# Patient Record
Sex: MALE | Race: WHITE | Employment: OTHER | URBAN - METROPOLITAN AREA
[De-identification: names, ages, dates, MRNs, and addresses within clinical notes are randomized per-mention and may not be internally consistent; named-entity substitution may affect disease eponyms.]

---

## 2017-01-05 ENCOUNTER — APPOINTMENT (OUTPATIENT)
Dept: ULTRASOUND IMAGING | Facility: HOSPITAL | Age: 82
End: 2017-01-05
Attending: EMERGENCY MEDICINE
Payer: MEDICARE

## 2017-01-05 ENCOUNTER — HOSPITAL ENCOUNTER (EMERGENCY)
Facility: HOSPITAL | Age: 82
Discharge: HOME | End: 2017-01-05
Attending: EMERGENCY MEDICINE
Payer: MEDICARE

## 2017-01-05 VITALS
RESPIRATION RATE: 18 BRPM | HEIGHT: 70 IN | DIASTOLIC BLOOD PRESSURE: 86 MMHG | WEIGHT: 250 LBS | BODY MASS INDEX: 35.79 KG/M2 | TEMPERATURE: 98 F | HEART RATE: 98 BPM | OXYGEN SATURATION: 98 % | SYSTOLIC BLOOD PRESSURE: 118 MMHG

## 2017-01-05 DIAGNOSIS — R60.0 EXTREMITY EDEMA: Primary | ICD-10-CM

## 2017-01-05 DIAGNOSIS — L03.119 CELLULITIS OF LOWER EXTREMITY, UNSPECIFIED LATERALITY: ICD-10-CM

## 2017-01-05 LAB
ALBUMIN SERPL-MCNC: 3.4 G/DL (ref 3.5–4.8)
ALP LIVER SERPL-CCNC: 96 U/L (ref 45–117)
ALT SERPL-CCNC: 25 U/L (ref 17–63)
APTT PPP: 29.2 SECONDS (ref 25–34)
AST SERPL-CCNC: 19 U/L (ref 15–41)
BASOPHILS # BLD AUTO: 0.06 X10(3) UL (ref 0–0.1)
BASOPHILS NFR BLD AUTO: 0.8 %
BILIRUB SERPL-MCNC: 0.3 MG/DL (ref 0.1–2)
BUN BLD-MCNC: 14 MG/DL (ref 8–20)
CALCIUM BLD-MCNC: 8.6 MG/DL (ref 8.3–10.3)
CHLORIDE: 102 MMOL/L (ref 101–111)
CO2: 27 MMOL/L (ref 22–32)
CREAT BLD-MCNC: 1.56 MG/DL (ref 0.7–1.3)
EOSINOPHIL # BLD AUTO: 0.34 X10(3) UL (ref 0–0.3)
EOSINOPHIL NFR BLD AUTO: 4.6 %
ERYTHROCYTE [DISTWIDTH] IN BLOOD BY AUTOMATED COUNT: 13.2 % (ref 11.5–16)
GLUCOSE BLD-MCNC: 256 MG/DL (ref 70–99)
HCT VFR BLD AUTO: 47.6 % (ref 37–53)
HGB BLD-MCNC: 15.3 G/DL (ref 13–17)
IMMATURE GRANULOCYTE COUNT: 0.03 X10(3) UL (ref 0–1)
IMMATURE GRANULOCYTE RATIO %: 0.4 %
INR BLD: 1 (ref 0.89–1.12)
LYMPHOCYTES # BLD AUTO: 1.07 X10(3) UL (ref 0.9–4)
LYMPHOCYTES NFR BLD AUTO: 14.4 %
M PROTEIN MFR SERPL ELPH: 7.9 G/DL (ref 6.1–8.3)
MCH RBC QN AUTO: 30.6 PG (ref 27–33.2)
MCHC RBC AUTO-ENTMCNC: 32.1 G/DL (ref 31–37)
MCV RBC AUTO: 95.2 FL (ref 80–99)
MONOCYTES # BLD AUTO: 0.63 X10(3) UL (ref 0.1–0.6)
MONOCYTES NFR BLD AUTO: 8.5 %
NEUTROPHIL ABS PRELIM: 5.31 X10 (3) UL (ref 1.3–6.7)
NEUTROPHILS # BLD AUTO: 5.31 X10(3) UL (ref 1.3–6.7)
NEUTROPHILS NFR BLD AUTO: 71.3 %
PLATELET # BLD AUTO: 230 10(3)UL (ref 150–450)
POTASSIUM SERPL-SCNC: 4.1 MMOL/L (ref 3.6–5.1)
PSA SERPL DL<=0.01 NG/ML-MCNC: 13.5 SECONDS (ref 12.3–14.8)
RBC # BLD AUTO: 5 X10(6)UL (ref 3.8–5.8)
RED CELL DISTRIBUTION WIDTH-SD: 46.5 FL (ref 35.1–46.3)
SODIUM SERPL-SCNC: 136 MMOL/L (ref 136–144)
WBC # BLD AUTO: 7.4 X10(3) UL (ref 4–13)

## 2017-01-05 PROCEDURE — 99285 EMERGENCY DEPT VISIT HI MDM: CPT

## 2017-01-05 PROCEDURE — 85610 PROTHROMBIN TIME: CPT | Performed by: EMERGENCY MEDICINE

## 2017-01-05 PROCEDURE — 85730 THROMBOPLASTIN TIME PARTIAL: CPT | Performed by: EMERGENCY MEDICINE

## 2017-01-05 PROCEDURE — 99284 EMERGENCY DEPT VISIT MOD MDM: CPT

## 2017-01-05 PROCEDURE — 93970 EXTREMITY STUDY: CPT

## 2017-01-05 PROCEDURE — 96375 TX/PRO/DX INJ NEW DRUG ADDON: CPT

## 2017-01-05 PROCEDURE — 80053 COMPREHEN METABOLIC PANEL: CPT | Performed by: EMERGENCY MEDICINE

## 2017-01-05 PROCEDURE — 96365 THER/PROPH/DIAG IV INF INIT: CPT

## 2017-01-05 PROCEDURE — 85025 COMPLETE CBC W/AUTO DIFF WBC: CPT | Performed by: EMERGENCY MEDICINE

## 2017-01-05 RX ORDER — FUROSEMIDE 10 MG/ML
40 INJECTION INTRAMUSCULAR; INTRAVENOUS ONCE
Status: COMPLETED | OUTPATIENT
Start: 2017-01-05 | End: 2017-01-05

## 2017-01-05 RX ORDER — FUROSEMIDE 40 MG/1
40 TABLET ORAL 2 TIMES DAILY
Qty: 20 TABLET | Refills: 0 | Status: SHIPPED | OUTPATIENT
Start: 2017-01-05 | End: 2017-06-28

## 2017-01-05 RX ORDER — CEPHALEXIN 500 MG/1
500 CAPSULE ORAL 3 TIMES DAILY
Qty: 30 CAPSULE | Refills: 0 | Status: SHIPPED | OUTPATIENT
Start: 2017-01-05 | End: 2017-01-15

## 2017-01-05 NOTE — ED PROVIDER NOTES
Patient Seen in: [facility] Emergency Department    History   Patient presents with:  Swelling Edema (cardiovascular, metabolic)    Stated Complaint: leg swelling    HPI    25-year-old male presents for evaluation of swollen legs.   Patient has chron MG Oral Tab,  Take 1 tablet (10 mg total) by mouth nightly. Multiple Vitamin Oral Tab,  Take 1 tablet by mouth daily. Ranitidine HCl (ZANTAC) 150 MG Oral Tab,  Take 1 tablet by mouth 2 (two) times daily.    Diapers & Supplies ([facility] DIAPERS JUMBO S5) Does speech pattern  Musculoskeletal: No tenderness or deformity noted.           ED Course     Labs Reviewed   COMP METABOLIC PANEL (14) - Abnormal; Notable for the following:     Glucose 256 (*)     Creatinine 1.56 (*)     GFR 41 (*)     Albumin 3.4 (*)     Al visit.     Follow-up:  Juwan Rojas MD  [phone]    In 2 days  For wound re-check      Medications Prescribed:  Current Discharge Medication List    START taking these medications    furosemide (LASIX

## 2017-01-05 NOTE — ED INITIAL ASSESSMENT (HPI)
Pt instructed from PCP to come and be evaluated for leg swelling, pt family states legs are usually swollen but not this large and pt has hard time walking

## 2017-05-19 ENCOUNTER — HOSPITAL ENCOUNTER (INPATIENT)
Facility: HOSPITAL | Age: 82
LOS: 1 days | Discharge: HOME | DRG: 603 | End: 2017-05-20
Attending: EMERGENCY MEDICINE | Admitting: HOSPITALIST
Payer: MEDICARE

## 2017-05-19 ENCOUNTER — APPOINTMENT (OUTPATIENT)
Dept: GENERAL RADIOLOGY | Facility: HOSPITAL | Age: 82
DRG: 603 | End: 2017-05-19
Attending: EMERGENCY MEDICINE
Payer: MEDICARE

## 2017-05-19 ENCOUNTER — APPOINTMENT (OUTPATIENT)
Dept: ULTRASOUND IMAGING | Facility: HOSPITAL | Age: 82
DRG: 603 | End: 2017-05-19
Attending: EMERGENCY MEDICINE
Payer: MEDICARE

## 2017-05-19 DIAGNOSIS — I87.8 VENOUS STASIS: ICD-10-CM

## 2017-05-19 DIAGNOSIS — L03.116 CELLULITIS OF LEFT LOWER EXTREMITY: Primary | ICD-10-CM

## 2017-05-19 DIAGNOSIS — E11.40 TYPE 2 DIABETES MELLITUS WITH DIABETIC NEUROPATHY, WITHOUT LONG-TERM CURRENT USE OF INSULIN (HCC): ICD-10-CM

## 2017-05-19 DIAGNOSIS — L03.115 CELLULITIS OF RIGHT LOWER EXTREMITY: ICD-10-CM

## 2017-05-19 DIAGNOSIS — R60.0 BILATERAL LEG EDEMA: ICD-10-CM

## 2017-05-19 PROCEDURE — 96365 THER/PROPH/DIAG IV INF INIT: CPT

## 2017-05-19 PROCEDURE — 71010 XR CHEST AP PORTABLE  (CPT=71010): CPT | Performed by: EMERGENCY MEDICINE

## 2017-05-19 PROCEDURE — 80053 COMPREHEN METABOLIC PANEL: CPT | Performed by: EMERGENCY MEDICINE

## 2017-05-19 PROCEDURE — 85025 COMPLETE CBC W/AUTO DIFF WBC: CPT | Performed by: EMERGENCY MEDICINE

## 2017-05-19 PROCEDURE — 99285 EMERGENCY DEPT VISIT HI MDM: CPT

## 2017-05-19 PROCEDURE — 87040 BLOOD CULTURE FOR BACTERIA: CPT | Performed by: EMERGENCY MEDICINE

## 2017-05-19 PROCEDURE — 83880 ASSAY OF NATRIURETIC PEPTIDE: CPT | Performed by: EMERGENCY MEDICINE

## 2017-05-19 PROCEDURE — 93970 EXTREMITY STUDY: CPT | Performed by: EMERGENCY MEDICINE

## 2017-05-19 RX ORDER — POTASSIUM CHLORIDE 20 MEQ/1
20 TABLET, EXTENDED RELEASE ORAL 2 TIMES DAILY
Status: DISCONTINUED | OUTPATIENT
Start: 2017-05-19 | End: 2017-05-20

## 2017-05-19 RX ORDER — MEMANTINE HYDROCHLORIDE 10 MG/1
10 TABLET ORAL 2 TIMES DAILY
Status: DISCONTINUED | OUTPATIENT
Start: 2017-05-19 | End: 2017-05-20

## 2017-05-19 RX ORDER — FAMOTIDINE 20 MG/1
20 TABLET ORAL NIGHTLY
Status: DISCONTINUED | OUTPATIENT
Start: 2017-05-19 | End: 2017-05-20

## 2017-05-19 RX ORDER — DEXTROSE MONOHYDRATE 25 G/50ML
50 INJECTION, SOLUTION INTRAVENOUS
Status: DISCONTINUED | OUTPATIENT
Start: 2017-05-19 | End: 2017-05-20

## 2017-05-19 RX ORDER — HEPARIN SODIUM 5000 [USP'U]/ML
5000 INJECTION, SOLUTION INTRAVENOUS; SUBCUTANEOUS EVERY 8 HOURS SCHEDULED
Status: DISCONTINUED | OUTPATIENT
Start: 2017-05-19 | End: 2017-05-20

## 2017-05-19 RX ORDER — OXYBUTYNIN CHLORIDE 5 MG/1
5 TABLET ORAL 3 TIMES DAILY
Status: DISCONTINUED | OUTPATIENT
Start: 2017-05-19 | End: 2017-05-20

## 2017-05-19 RX ORDER — BUMETANIDE 2 MG/1
2 TABLET ORAL DAILY
Status: DISCONTINUED | OUTPATIENT
Start: 2017-05-20 | End: 2017-05-20

## 2017-05-19 RX ORDER — LISINOPRIL 40 MG/1
40 TABLET ORAL
Status: DISCONTINUED | OUTPATIENT
Start: 2017-05-20 | End: 2017-05-20

## 2017-05-19 RX ORDER — ONDANSETRON 2 MG/ML
4 INJECTION INTRAMUSCULAR; INTRAVENOUS EVERY 6 HOURS PRN
Status: DISCONTINUED | OUTPATIENT
Start: 2017-05-19 | End: 2017-05-20

## 2017-05-19 RX ORDER — POLYETHYLENE GLYCOL 3350 17 G/17G
17 POWDER, FOR SOLUTION ORAL DAILY
Status: DISCONTINUED | OUTPATIENT
Start: 2017-05-19 | End: 2017-05-20

## 2017-05-19 RX ORDER — ACETAMINOPHEN 325 MG/1
650 TABLET ORAL EVERY 6 HOURS PRN
Status: DISCONTINUED | OUTPATIENT
Start: 2017-05-19 | End: 2017-05-20

## 2017-05-19 RX ORDER — DOXEPIN HYDROCHLORIDE 50 MG/1
1 CAPSULE ORAL DAILY
Status: DISCONTINUED | OUTPATIENT
Start: 2017-05-20 | End: 2017-05-20

## 2017-05-20 VITALS
HEIGHT: 72 IN | RESPIRATION RATE: 18 BRPM | HEART RATE: 80 BPM | BODY MASS INDEX: 34.56 KG/M2 | OXYGEN SATURATION: 94 % | DIASTOLIC BLOOD PRESSURE: 49 MMHG | SYSTOLIC BLOOD PRESSURE: 112 MMHG | WEIGHT: 255.19 LBS | TEMPERATURE: 98 F

## 2017-05-20 PROBLEM — M79.89 LEG SWELLING: Status: ACTIVE | Noted: 2017-05-20

## 2017-05-20 PROBLEM — Z91.81 AT RISK FOR FALLING: Status: ACTIVE | Noted: 2017-05-20

## 2017-05-20 PROCEDURE — 83036 HEMOGLOBIN GLYCOSYLATED A1C: CPT | Performed by: HOSPITALIST

## 2017-05-20 PROCEDURE — 82962 GLUCOSE BLOOD TEST: CPT

## 2017-05-20 PROCEDURE — 85610 PROTHROMBIN TIME: CPT | Performed by: HOSPITALIST

## 2017-05-20 PROCEDURE — 80048 BASIC METABOLIC PNL TOTAL CA: CPT | Performed by: HOSPITALIST

## 2017-05-20 PROCEDURE — 85025 COMPLETE CBC W/AUTO DIFF WBC: CPT | Performed by: HOSPITALIST

## 2017-05-20 NOTE — ED PROVIDER NOTES
Patient Seen in: [facility] Emergency Department    History   Patient presents with:  Swelling Edema (cardiovascular, metabolic)    Stated Complaint: lower extremity edema.   needs placement    HPI    51-year-old male presents to the emergency depart Tab,  TK 1 T PO QHS PRN FOR SLEEP OR ANXIETY   glipiZIDE 5 MG Oral Tab,  Take 1 tablet (5 mg total) by mouth 2 (two) times daily before meals. Oxybutynin Chloride 5 MG Oral Tab,  Take 1 tablet (5 mg total) by mouth 3 (three) times daily.    Memantine HCl Neck: Normal range of motion. Neck supple. Cardiovascular: Normal rate, regular rhythm, normal heart sounds and intact distal pulses. Pulmonary/Chest: Effort normal and breath sounds normal. No stridor. He has no wheezes. He has no rales.    Abdomina DRAW LIGHT GREEN   BLOOD CULTURE   BLOOD CULTURE    chest x-ray without obvious infiltrate or significant fluid overload  Ultrasound of lower extremities no evidence of DVT marked soft tissue edema    MDM     Patient had an IV established and blood work ob

## 2017-05-20 NOTE — PROGRESS NOTES
[facility] Pharmacy Note: Renal dose adjustment for Famotidine (Pepcid)  Mei Villalobos has been prescribed Famotidine (Pepcid) 20 mg every 12 hours. Estimated Creatinine Clearance: 45.7 mL/min (based on Cr of 1.39).     His calculated creatinine clear

## 2017-05-20 NOTE — PLAN OF CARE
NURSING DISCHARGE NOTE    Discharged Home via Wheelchair. Accompanied by Spouse  Belongings Taken by patient/family. AVS and education provided to patient and wife at the bedside.  Discharge plan of care and follow up needs reviewed, patient/wife ex

## 2017-05-20 NOTE — PROGRESS NOTES
[facility] Dosing Service  Antibiotic Dosing    Carl Romo is a 80year old male for whom pharmacy is dosing Unasyn for treatment of  cellulitis. Allergies: has No Known Allergies.     Vitals: /55 mmHg  Pulse 101  Temp(Src) 98.5 °F (36.9 °C

## 2017-05-20 NOTE — H&P
[facility] Hospitalist History and Physical      Patient presents with:  Swelling Edema (cardiovascular, metabolic)       PCP: Nataliia Gilmore MD      History of Present Illness: Patient is a 80year old male with PMH sig for T2DM, HTN, chronic venous stasis, depre Disp: 60 tablet Rfl: 1   Oxybutynin Chloride 5 MG Oral Tab Take 1 tablet (5 mg total) by mouth 3 (three) times daily. Disp: 90 tablet Rfl: 3   Memantine HCl (NAMENDA) 10 MG Oral Tab Take 1 tablet (10 mg total) by mouth 2 (two) times daily.  Disp: 60 tablet normal. No masses,  No organomegaly. Non distended   Extremities: Chronic venous stasis changes / lymphedema bilaterally   Skin: Bilateral chronic appearing swelling stasis changes, skin thickening, slightly red.  No drainage   Neurologic: Normal strength, Maral Carranza MD on 5/19/2017 at 20:28     Approved by: Maral Carranza MD            Xr Chest Ap Portable  (cpt=71010)    5/19/2017  PROCEDURE:  XR CHEST AP PORTABLE (CPT=71010)  TECHNIQUE:  AP chest radiograph was obtained.   COMPARISON:  [facility]

## 2017-05-20 NOTE — PROGRESS NOTES
NURSING ADMISSION NOTE      Patient admitted via cart. Oriented to room. Safety precautions initiated. Bed in low position. Bed alarm on. Call light in reach. Primary language is [facility]. Patient is poor historian,family unavailable.

## 2017-05-20 NOTE — CONSULTS
INFECTIOUS DISEASE CONSULTATION    Germania Macdonald Patient Status:  Inpatient    1935 MRN [id number]   [facility] 4NW-A Attending Cale Michaud MD   [facility] Day # 1 PCP Jovi Jewell mg, Oral, Nightly  •  glucose (DEX4) oral liquid 15 g, 15 g, Oral, Q15 Min PRN **OR** Glucose-Vitamin C (DEX-4) 4-0.006 g chewable tab 4 tablet, 4 tablet, Oral, Q15 Min PRN **OR** dextrose injection 50 mL, 50 mL, Intravenous, Q15 Min PRN **OR** glucose (DE 110   --    AST  12*   --    ALT  20   --    BILT  0.4   --    TP  8.2   --            Lab Results  Component Value Date   INR 1.11 05/20/2017   PTP 14.4 05/20/2017   PGLU 178 05/20/2017       Problem list reviewed:  Patient Active Problem List:     James Schmidt

## 2017-05-20 NOTE — ED NOTES
PCT 4 M/O WITH REOPRT GIVEN TO AND ACCP[ETED BY MAY STUART.   PREPARD FOR TX  PER CART VIA TRANSOPRT

## 2017-05-20 NOTE — CM/SW NOTE
05/20/17 1100   CM/SW Referral Data   Referral Source Social Work (self-referral)   Reason for Referral Discharge planning   Informant Spouse   Patient Info   Patient's Mental Status Alert   Patient's [address]   Patient lives with Spouse

## 2017-05-20 NOTE — PLAN OF CARE
SKIN/TISSUE INTEGRITY - ADULT    • Skin integrity remains intact Not Progressing        Left feet skin tear cleaned new dressing applied. Wound care referral made.    CARDIOVASCULAR - ADULT    • Maintains optimal cardiac output and hemodynamic stability Pro

## 2017-05-24 NOTE — DISCHARGE SUMMARY
General Medicine Discharge Summary     Patient ID:  Minto Santiago  80year old  6/4/1935    Admit date: 5/19/2017    Discharge date and time: 5/20/2017  1:20 PM     Attending Physician: Shelley Khan admitted for worsening wounds of his lower extremities, possible cellulitis    Bilateral chronic lymphedema/stasis changes  -Started on IV unasyn for possible cellulitis  -Venous duplex negative for DVT  -ID consulted, agree with recommendations, changes a R-5    Multiple Vitamin Oral Tab  Take 1 tablet by mouth daily. , Historical    Ranitidine HCl (ZANTAC) 150 MG Oral Tab  Take 1 tablet by mouth 2 (two) times daily. , Normal, Disp-60 tablet, R-11    Diapers & Supplies ([facility] DIAPERS JUMBO S5) Does not apply Mi

## 2017-06-01 ENCOUNTER — OFFICE VISIT (OUTPATIENT)
Dept: WOUND CARE | Facility: HOSPITAL | Age: 82
End: 2017-06-01
Attending: NURSE PRACTITIONER
Payer: MEDICARE

## 2017-06-01 DIAGNOSIS — L97.511 NON-PRESSURE CHRONIC ULCER OF OTHER PART OF RIGHT FOOT LIMITED TO BREAKDOWN OF SKIN (HCC): Primary | ICD-10-CM

## 2017-06-01 DIAGNOSIS — Q82.0 HEREDITARY LYMPHEDEMA: ICD-10-CM

## 2017-06-01 DIAGNOSIS — B37.2 CANDIDIASIS OF SKIN: ICD-10-CM

## 2017-06-01 PROCEDURE — 29581 APPL MULTLAYER CMPRN SYS LEG: CPT

## 2017-06-01 PROCEDURE — 99214 OFFICE O/P EST MOD 30 MIN: CPT

## 2017-06-05 ENCOUNTER — OFFICE VISIT (OUTPATIENT)
Dept: WOUND CARE | Facility: HOSPITAL | Age: 82
End: 2017-06-05
Attending: NURSE PRACTITIONER
Payer: MEDICARE

## 2017-06-05 DIAGNOSIS — Q82.0 HEREDITARY LYMPHEDEMA: ICD-10-CM

## 2017-06-05 DIAGNOSIS — L97.511 NON-PRESSURE CHRONIC ULCER OF OTHER PART OF RIGHT FOOT LIMITED TO BREAKDOWN OF SKIN (HCC): Primary | ICD-10-CM

## 2017-06-05 DIAGNOSIS — B37.2 CANDIDIASIS OF SKIN: ICD-10-CM

## 2017-06-05 PROCEDURE — 29581 APPL MULTLAYER CMPRN SYS LEG: CPT

## 2017-06-05 NOTE — PROGRESS NOTES
Progress Note Details  Patient Name: Kenyetta Hammonds Date: 6/1/2017   Patient Number: [id number] Physician / Matias Lugo: Tori Oz   Patient YOB: 1935 Facility: [facility]    Chief Complaint  This information was obtained fr Cancer - No History, Diabetes - Father, Heart Disease - Father, Hypertension - No History, Kidney Disease - No History, Lung Disease - No History, Mental Illness - No History, Stroke - Sibling, Thyroid Problems - No History, Tuberculosis - No History, Darrian Dewitt lisinopril 40 mg tablet oral tablet oral  Zantac 150 mg tablet oral tablet oral  Miralax 17 gram oral powder packet oral powder in packet oral  bumetanide 2 mg tablet oral tablet oral  Lasix 40 mg tablet oral tablet oral  Daily Multivitamin 200 mcg-100 mcg The periwound skin exhibited: Brawny Induration, Edema, Moist, Erythema. The periwound skin did not exhibit: Excoriation, Crepitus, Fluctuance, Friable, Rash, Cyanosis, Ecchymosis, Hemosiderosis, Pallor, Rubor.  The temperature of the periwound skin is Warm Follow-Up Appointments  Return Appointment in 1 week. - Dr Christopher Andersen on Thursday in the wound care center  RN visit for assessment of wound(s). - RN visit for rewrapping on Monday    Follow-Up Appointments:  A follow-up appointment should be scheduled.

## 2017-06-08 ENCOUNTER — OFFICE VISIT (OUTPATIENT)
Dept: WOUND CARE | Facility: HOSPITAL | Age: 82
DRG: 871 | End: 2017-06-08
Attending: NURSE PRACTITIONER
Payer: MEDICARE

## 2017-06-08 DIAGNOSIS — B37.2 CANDIDIASIS OF SKIN: ICD-10-CM

## 2017-06-08 DIAGNOSIS — L97.511 NON-PRESSURE CHRONIC ULCER OF OTHER PART OF RIGHT FOOT LIMITED TO BREAKDOWN OF SKIN (HCC): Primary | ICD-10-CM

## 2017-06-08 DIAGNOSIS — Q82.0 HEREDITARY LYMPHEDEMA: ICD-10-CM

## 2017-06-08 PROCEDURE — 29581 APPL MULTLAYER CMPRN SYS LEG: CPT

## 2017-06-09 ENCOUNTER — HOSPITAL ENCOUNTER (INPATIENT)
Facility: HOSPITAL | Age: 82
LOS: 5 days | Discharge: NURSING, REHAB OR LONG-TERM CARE | DRG: 871 | End: 2017-06-14
Attending: EMERGENCY MEDICINE | Admitting: HOSPITALIST
Payer: MEDICARE

## 2017-06-09 ENCOUNTER — APPOINTMENT (OUTPATIENT)
Dept: CT IMAGING | Facility: HOSPITAL | Age: 82
DRG: 871 | End: 2017-06-09
Attending: EMERGENCY MEDICINE
Payer: MEDICARE

## 2017-06-09 ENCOUNTER — APPOINTMENT (OUTPATIENT)
Dept: GENERAL RADIOLOGY | Facility: HOSPITAL | Age: 82
DRG: 871 | End: 2017-06-09
Attending: EMERGENCY MEDICINE
Payer: MEDICARE

## 2017-06-09 DIAGNOSIS — E87.1 HYPONATREMIA: ICD-10-CM

## 2017-06-09 DIAGNOSIS — E87.5 HYPERKALEMIA: ICD-10-CM

## 2017-06-09 DIAGNOSIS — A41.9 SEPSIS DUE TO PNEUMONIA (HCC): Primary | ICD-10-CM

## 2017-06-09 DIAGNOSIS — J18.9 SEPSIS DUE TO PNEUMONIA (HCC): Primary | ICD-10-CM

## 2017-06-09 DIAGNOSIS — D72.829 LEUKOCYTOSIS, UNSPECIFIED TYPE: ICD-10-CM

## 2017-06-09 DIAGNOSIS — L03.116 CELLULITIS OF LEFT LOWER EXTREMITY: ICD-10-CM

## 2017-06-09 PROCEDURE — 71020 XR CHEST PA + LAT CHEST (CPT=71020): CPT | Performed by: EMERGENCY MEDICINE

## 2017-06-09 PROCEDURE — 70450 CT HEAD/BRAIN W/O DYE: CPT | Performed by: EMERGENCY MEDICINE

## 2017-06-09 RX ORDER — MEMANTINE HYDROCHLORIDE 10 MG/1
10 TABLET ORAL 2 TIMES DAILY
Status: DISCONTINUED | OUTPATIENT
Start: 2017-06-09 | End: 2017-06-14

## 2017-06-09 RX ORDER — HEPARIN SODIUM 5000 [USP'U]/ML
5000 INJECTION, SOLUTION INTRAVENOUS; SUBCUTANEOUS EVERY 8 HOURS SCHEDULED
Status: DISCONTINUED | OUTPATIENT
Start: 2017-06-09 | End: 2017-06-14

## 2017-06-09 RX ORDER — FAMOTIDINE 20 MG/1
20 TABLET ORAL DAILY
Status: DISCONTINUED | OUTPATIENT
Start: 2017-06-10 | End: 2017-06-14

## 2017-06-09 RX ORDER — ALPRAZOLAM 0.25 MG/1
0.25 TABLET ORAL NIGHTLY PRN
Status: DISCONTINUED | OUTPATIENT
Start: 2017-06-09 | End: 2017-06-14

## 2017-06-09 RX ORDER — POLYETHYLENE GLYCOL 3350 17 G/17G
17 POWDER, FOR SOLUTION ORAL DAILY
Status: DISCONTINUED | OUTPATIENT
Start: 2017-06-10 | End: 2017-06-14

## 2017-06-09 RX ORDER — SODIUM CHLORIDE 9 MG/ML
INJECTION, SOLUTION INTRAVENOUS CONTINUOUS
Status: DISCONTINUED | OUTPATIENT
Start: 2017-06-09 | End: 2017-06-09

## 2017-06-09 RX ORDER — ACETAMINOPHEN 500 MG
1000 TABLET ORAL ONCE
Status: COMPLETED | OUTPATIENT
Start: 2017-06-09 | End: 2017-06-09

## 2017-06-09 RX ORDER — OXYBUTYNIN CHLORIDE 5 MG/1
5 TABLET ORAL 3 TIMES DAILY
Status: DISCONTINUED | OUTPATIENT
Start: 2017-06-09 | End: 2017-06-14

## 2017-06-09 NOTE — ED INITIAL ASSESSMENT (HPI)
Per EMS pt has been deteriorating physically and mentally over past couple of days, poor appetite, lives with family, Hx of parkinsons, blood glucose 242 pta.

## 2017-06-09 NOTE — ED PROVIDER NOTES
Patient Seen in: [facility] Emergency Department    History   Patient presents with:  Altered Mental Status (neurologic)    Stated Complaint:     HPI     59-year-old male presents emergency department who apparently has been deteriorating physically Tab CR,  TAKE 1 TABLET(20 MEQ) BY MOUTH TWICE DAILY   alprazolam 0.25 MG Oral Tab,  TK 1 T PO QHS PRN FOR SLEEP OR ANXIETY   glipiZIDE 5 MG Oral Tab,  Take 1 tablet (5 mg total) by mouth 2 (two) times daily before meals.    Oxybutynin Chloride 5 MG Oral Tab 101.4  HEENT: Pupils equal react to light extraocular muscles intact no scleral icterus, mucous membranes are moist, there is no erythema or exudate in the posterior pharynx  Neck: Supple no JVD no lymphadenopathy no meningismus no carotid bruit  CV: Tachy ---------                               -----------         ------                     CBC W/ DIFFERENTIAL[[id number]]          Abnormal            Final result                 Please view results for these tests on the individual orders.    LACTIC ACID, P information is transmitted to the ACR ([facility]) NRDR ([address]) which includes the Dose Index Registry.   PATIENT STATED HISTORY: (As transcribed by Technologist)  Patient with altered mental status and loss of Sepsis due to pneumonia ([facility] 75.) J18.9, A41.9 6/9/2017 Unknown

## 2017-06-10 RX ORDER — MAGNESIUM OXIDE 400 MG (241.3 MG MAGNESIUM) TABLET
400 TABLET ONCE
Status: COMPLETED | OUTPATIENT
Start: 2017-06-10 | End: 2017-06-10

## 2017-06-10 NOTE — PROGRESS NOTES
[facility] Pharmacy Note: Antimicrobial Weight Dose Adjustment for: Zosyn (piperacillin/tazobactam)    Nain Hernandez is a 80year old male who has been prescribed Zosyn (piperacillin/tazobactam) 3.375 g IV q8h.   CrCl is estimated creatinine clearance is 40.9 m

## 2017-06-10 NOTE — H&P
[facility] Hospitalist H&P       CC: Patient presents with:  Altered Mental Status (neurologic)       PCP: Venus Green MD    History of Present Illness:  Patient is a 80year old male with PMH sig for DMII, HTN, depression, dementia, Parkinson's who presented Systems  Comprehensive ROS reviewed and negative except for what's stated above.        OBJECTIVE:  /68 mmHg  Pulse 80  Temp(Src) 98.2 °F (36.8 °C) (Oral)  Resp 15  Ht 6' (1.829 m)  Wt 255 lb (115.667 kg)  BMI 34.58 kg/m2  SpO2 97%  PE:  Gen: alert, s Degenerative changes in the spine. 6/9/2017  CONCLUSION:  Findings concerning for developing pneumonia in the lower lungs. Clinical correlation recommended.    Dictated by: Cee Tariq MD on 6/09/2017 at 18:45     Approved by: Cee Tariq MD BILAT LOWER  COMPARISON:  JEANNIE US VENOUS DOPPLER LEG BILAT - [facility] (CPT=93970), 1/05/2017, 10:16.   INDICATIONS:  Bilateral lower extremity edema  TECHNIQUE:  Real time, grey scale, and duplex ultrasound was used to evaluate the lower extremity venou noted      ASSESSMENT / PLAN:     Sepsis 2/2 CAP  - lactic acid downtrended  - pt hemodynamically stable and with some improvement in mentation  - cont zosyn/azithro per protocol for now  - no O2 needs  - will need repeat CXR as OP in 4-6w to confirm resol

## 2017-06-10 NOTE — PLAN OF CARE
RESPIRATORY - ADULT    • Achieves optimal ventilation and oxygenation Progressing        SKIN/TISSUE INTEGRITY - ADULT    • Skin integrity remains intact Progressing    • Incision(s), wounds(s) or drain site(s) healing without S/S of infection Progressing

## 2017-06-10 NOTE — PROGRESS NOTES
[facility] Pharmacy Note:  Renal Dose Adjustment    Abby Salgado has been prescribed famotidine (PEPCID) 20 mg orally every 12 hours. Estimated Creatinine Clearance: 40.9 mL/min (based on Cr of 1.53).     His calculated creatinine clearance is <50 ml/min, t

## 2017-06-10 NOTE — PHYSICAL THERAPY NOTE
PHYSICAL THERAPY EVALUATION - INPATIENT     Room Number: 3579/9607-U  Evaluation Date: 6/10/2017  Type of Evaluation: Initial  Physician Order: PT Eval and Treat    Presenting Problem: Pneumonia  Reason for Therapy: Mobility Dysfunction and Discharge P Standing: Fair -    ADDITIONAL TESTS  Additional Tests: Elderly Mobility Scale     Elderly Mobility Scale: 7/20                           NEUROLOGICAL FINDINGS                      ACTIVITY TOLERANCE  Room air  No shortness of breath    AM-PAC '6-Clicks' I session/findings; All patient questions and concerns addressed; Family present    ASSESSMENT     Patient is a 80year old male admitted 6/9/2017 for pneumonia and weakness.    In this PT evaluation, the patient presents with the following impairments: decreas

## 2017-06-10 NOTE — ED NOTES
Dr. Stuart Cazares was consulted about IV fluid administration per sepsis protocol. Per Dr. Stuart Cazares, we will hold off due to the pt having edema in his lower extremities in order to prevent fluid overload.

## 2017-06-10 NOTE — OCCUPATIONAL THERAPY NOTE
OCCUPATIONAL THERAPY EVALUATION - INPATIENT     Room Number: 7164/6659-Z  Evaluation Date: 6/10/2017  Type of Evaluation: Initial  Presenting Problem: Sepsis due to pneumonia    Physician Order: IP Consult to Occupational Therapy  Reason for Therapy: ADL/I RESTRICTION  Weight Bearing Restriction: None                PAIN ASSESSMENT  Rating: Unable to rate          COGNITION  Overall Cognitive Status:  Impaired  Attention Span:  difficulty attending to directions and difficulty dividing attention  Orientation ASSESSMENT  Supine to Sit : Minimum assistance  Sit to Stand: Minimum assistance    Skilled Therapy Provided: Pt was in a semi supine position on arrival, pt family was present.   Pt was willing to participate in therapy, however unsure of current situation eval/education; Compensatory technique education  Rehab Potential : Fair  Frequency (Obs): 5x/week  Number of Visits to Meet Established Goals: 5    ADL Goals  Patient will perform eating: with set up and with supervision  Patient will perform grooming: wit

## 2017-06-11 RX ORDER — SENNA AND DOCUSATE SODIUM 50; 8.6 MG/1; MG/1
1 TABLET, FILM COATED ORAL 2 TIMES DAILY
Status: DISCONTINUED | OUTPATIENT
Start: 2017-06-11 | End: 2017-06-14

## 2017-06-11 RX ORDER — ACETAMINOPHEN 325 MG/1
650 TABLET ORAL EVERY 6 HOURS PRN
Status: DISCONTINUED | OUTPATIENT
Start: 2017-06-11 | End: 2017-06-14

## 2017-06-11 NOTE — PLAN OF CARE
Impaired Functional Mobility    • Achieve highest/safest level of mobility/gait Progressing        RESPIRATORY - ADULT    • Achieves optimal ventilation and oxygenation Progressing        SKIN/TISSUE INTEGRITY - ADULT    • Skin integrity remains intact Pro

## 2017-06-11 NOTE — CM/SW NOTE
06/11/17 1600   CM/SW Referral Data   Referral Source Physician   Reason for Referral Discharge planning   Informant Spouse   Pertinent Medical Hx   Primary Care Physician Name dr Robbi Virgen Drug/Alcohol Use n   Major Changes

## 2017-06-11 NOTE — PROGRESS NOTES
[facility] Hospitalist Progress Note     Ava Signs Patient Status:  Inpatient    1935 MRN [id number]   [facility] 3NE-A Attending Phani Finney MD   Hosp Day # 2 PCP Allegra Huertas MD     CC: follow up    SUBJECTIVE:  Pt MUCH more dean Chloride  5 mg Oral TID   • PEG 3350  17 g Oral Daily   • famoTIDine  20 mg Oral Daily   • Heparin Sodium (Porcine)  5,000 Units Subcutaneous Q8H [address]     Continuous Infusing Medication:   PRN Medication:ALPRAZolam        Assessment/Plan:     Sepsis 2/2 CAP

## 2017-06-12 NOTE — CONSULTS
[facility]  Report of Inpatient Wound Care Consultation     Kaylin Andrea Patient Status:  Inpatient    1935 MRN [id number]   [facility] 3NE-A Attending Vandana Harris MD   Hosp Day # 3 PCP Naveed Puentes MD     [address] 4.3   --   4.5   --    --    --   4.0   --    CREATSERUM  1.53*   --   1.41*   --   1.49*   --    --    --   1.68*   --    BUN  26*   --   25*   --   21*   --    --    --   18   --    GLU  274*   --   165*   --   152*   --    --    --   171*   --    CA  9. wound bioburden  3. Decrease periwound edema      PLAN OF CARE:   We will continue to follow, will change compression again (if in-house) on Thursday. If discharged to facility, will need to come by back to [facility] upon discharge weekly.

## 2017-06-12 NOTE — PROGRESS NOTES
[facility] Hospitalist Progress Note     Reji Fajardo Patient Status:  Inpatient    1935 MRN [id number]   [facility] 3NE-A Attending Emelina Wong MD   Hosp Day # 3 PCP Georges Zambrano MD     CC: 81 yo man with h/o dementia, CKD who prese 06/11/17   1202  06/11/17   1613  06/11/17   2150  06/12/17   0804   PGLU  172*  146*  185*  241*  207*       Imaging:      Meds:   Scheduled Medication:  • Senna-Docusate Sodium  1 tablet Oral BID   • piperacillin-tazobactam  4.5 g Intravenous Q8H   • Mem

## 2017-06-13 RX ORDER — AZITHROMYCIN 250 MG/1
250 TABLET, FILM COATED ORAL
Status: DISCONTINUED | OUTPATIENT
Start: 2017-06-13 | End: 2017-06-14

## 2017-06-13 RX ORDER — CEFUROXIME AXETIL 250 MG/1
250 TABLET ORAL EVERY 12 HOURS SCHEDULED
Status: DISCONTINUED | OUTPATIENT
Start: 2017-06-13 | End: 2017-06-14

## 2017-06-13 NOTE — CM/SW NOTE
2:07pm    MSW reviewed chart, MSW left message for Admissions staff at [facility] to f/u on referral.

## 2017-06-13 NOTE — PLAN OF CARE
Diabetes/Glucose Control    • Glucose maintained within prescribed range Progressing        RESPIRATORY - ADULT    • Achieves optimal ventilation and oxygenation Progressing        SKIN/TISSUE INTEGRITY - ADULT    • Skin integrity remains intact Progressin

## 2017-06-13 NOTE — CM/SW NOTE
[facility] is inquiring if the pt has a secondary insurance. Verified with the pts wife, he does not. Babita Shepherd informed. Awaiting response.      Candida Machuca, RN [facility]    [phone] pgr: [phone]

## 2017-06-13 NOTE — OCCUPATIONAL THERAPY NOTE
OCCUPATIONAL THERAPY TREATMENT NOTE - INPATIENT     Room Number: 8380/7873-A  Session: 1   Number of Visits to Meet Established Goals: 5    Presenting Problem: Sepsis due to pneumonia    History related to current admission: Pt was admitted through ED on 6 Scale): 34.69  CMS Modifier (G-Code): CK    FUNCTIONAL TRANSFER ASSESSMENT  Supine to Sit : Not tested  Sit to Stand: Not tested    Skilled Therapy Provided: Pt seen foe adl trng and therex.  Pt just finished tx w PT and was fatigued but agreed to therex to

## 2017-06-13 NOTE — PHYSICAL THERAPY NOTE
PHYSICAL THERAPY TREATMENT NOTE - INPATIENT    Room Number: 1801/4070-V     Session: 1   Number of Visits to Meet Established Goals: 5    Presenting Problem: Pneumonia    Problem List  Principal Problem:    Sepsis due to pneumonia [facility])  Active Problems: AM-PAC Score:  Raw Score: 9   PT Approx Degree of Impairment Score: 81.38%   Standardized Score (AM-PAC Scale): 30.55   CMS Modifier (G-Code): CM    FUNCTIONAL ABILITY STATUS  Gait Assessment   Gait Assistance: Dependent assistance  Distance (ft): 0 restore strength, endurance, bed mobility, transfers, and to restore ambulation. Subacute rehab is recommended for 11-13 days.   After this period of rehabilitation patient should achieve min A level in transfers and household ambulation distances with RW t

## 2017-06-13 NOTE — PROGRESS NOTES
[facility] Hospitalist Progress Note     Carl Romo Patient Status:  Inpatient    1935 MRN [id number]   [facility] 3NE-A Attending Francie Romero MD   Hosp Day # 4 PCP Venus Green MD     CC: 79 yo man with h/o dementia, CKD who prese azithromycin  250 mg Oral Daily   • Senna-Docusate Sodium  1 tablet Oral BID   • Memantine HCl  10 mg Oral BID   • Oxybutynin Chloride  5 mg Oral TID   • PEG 3350  17 g Oral Daily   • famoTIDine  20 mg Oral Daily   • Heparin Sodium (Porcine)  5,000 Units S

## 2017-06-13 NOTE — CM/SW NOTE
Patient is a [address] readmission, previously enrolled on 6/6, under , with 66 days left in the BPCI episode. Plan for patient is for ESPERANZA and then to return home. Wife is the sole caregiver for her , who is mostly wheelchair bound.  Per wife she h

## 2017-06-14 VITALS
SYSTOLIC BLOOD PRESSURE: 105 MMHG | BODY MASS INDEX: 34.54 KG/M2 | TEMPERATURE: 98 F | HEIGHT: 72 IN | OXYGEN SATURATION: 93 % | WEIGHT: 255 LBS | DIASTOLIC BLOOD PRESSURE: 69 MMHG | HEART RATE: 70 BPM | RESPIRATION RATE: 18 BRPM

## 2017-06-14 PROBLEM — Z71.89 COUNSELING REGARDING GOALS OF CARE: Status: ACTIVE | Noted: 2017-06-14

## 2017-06-14 PROBLEM — Z51.5 PALLIATIVE CARE ENCOUNTER: Status: ACTIVE | Noted: 2017-06-14

## 2017-06-14 PROCEDURE — 99221 1ST HOSP IP/OBS SF/LOW 40: CPT | Performed by: CLINICAL NURSE SPECIALIST

## 2017-06-14 RX ORDER — CEFDINIR 300 MG/1
300 CAPSULE ORAL 2 TIMES DAILY
Qty: 10 CAPSULE | Refills: 0 | Status: SHIPPED | OUTPATIENT
Start: 2017-06-14 | End: 2017-06-19

## 2017-06-14 RX ORDER — CEFDINIR 300 MG/1
300 CAPSULE ORAL 2 TIMES DAILY
Status: DISCONTINUED | OUTPATIENT
Start: 2017-06-14 | End: 2017-06-14

## 2017-06-14 NOTE — CM/SW NOTE
[facility] [phone] called to arrange ambulance for  time of 4pm. The pt will be going to [facility]  [phone]. CM will send final updates once AVS has been completed.      Reema Angeles, RN [facility]    [phone]

## 2017-06-14 NOTE — PLAN OF CARE
Impaired Activities of Daily Living    • Achieve highest/safest level of independence in self care Progressing        Impaired Functional Mobility    • Achieve highest/safest level of mobility/gait Progressing        RESPIRATORY - ADULT    • Achieves optim

## 2017-06-14 NOTE — CM/SW NOTE
Per [facility], they will be able to transport the pt to [facility] for his wound clinic appointment tomorrow.

## 2017-06-14 NOTE — CM/SW NOTE
SW met w/ pt's wife and asked if she can arrive to [facility] between 3-330pm to sign paperwork (per [facility] request). Pt's wife requested maps/directions. SW printed and gave directions to pt's wife.      SW/CM to remain available for support and/or discharge plannin

## 2017-06-14 NOTE — CONSULTS
[facility] Dosing Service  Antibiotic Dosing    Radha Hernandez is a 80year old male for whom pharmacy is dosing cefdinir for treatment of  pneumonia. .  Other antibiotics (Not dosed by pharmacy): azithromycin    Allergies: has No Known Allergies.     V

## 2017-06-14 NOTE — CONSULTS
[address]   [id number]  Hospital Day #5  Date of Consult: 06/14/17       Reason for Consultation: Consult requested for evaluation of palliative care needs and goals of care discussion. care.  Spouse states patient \"never really discussed it\". Spouse states she will need \"to think about it\". Discussed code status given co-morbidities. Spouse undecided at this time. Patient to remain full code.     Advance Directive: Patient/family a

## 2017-06-14 NOTE — DISCHARGE SUMMARY
[facility]  Discharge Summary    Tamra Agudelo Patient Status:  Inpatient    1935 MRN [id number]   [facility] 3NE-A Attending Guzman Hall MD   Hosp Day # 5 PCP Rick Blevins MD     Date of Admission: 2017    Date of [address]  talking less than baseline. Eating less than baseline.  No clear alleviating or exacebating factors to this new symptoms.    Hospital Course:   Sepsis due to Pneumonia: sepsis resolved  PNA; patient still has cough but no shortness of breath and he is on Ro CHANGED    Blood Glucose Monitoring Suppl (EVELIA CONTOUR NEXT MONITOR) w/Device Does not apply Kit  1 Device by Does not apply route daily.   Qty: 1 kit Refills: 0    EVELIA MICROLET LANCETS Does not apply Misc  Use as directed to check blood sugar once maddy Jose Cárdenas MD  [facility] [phone]    In 1 week      [address] [address]  [phone]              Other Discharge Instructions:   Please se severe uncontrolled pain  4. redness, tenderness, or signs of infection (pain, swelling, redness, odor or green/yellow discharge)  5.  difficulty breathing, headache or visual disturbances  6. hives  7. persistent dizziness or light-headedness  8. extreme f

## 2017-06-14 NOTE — PLAN OF CARE
Diabetes/Glucose Control    • Glucose maintained within prescribed range Progressing        Impaired Activities of Daily Living    • Achieve highest/safest level of independence in self care Progressing        RESPIRATORY - ADULT    • Achieves optimal vent

## 2017-06-14 NOTE — PALLIATIVE CARE NOTE
Pt to d/c to [facility] today at [address] met with pt and wife who state they would like [address] at d/c; PCSW sent [facility] to [facility]; ECIN accepted; notified of d/c today. Pts wife states no other PCSW needs at this time.  Advised Unit CM

## 2017-06-14 NOTE — PROGRESS NOTES
Patient is being discharge to [facility] via [facility] ambulance. Report called. Wife waiting at [facility] for him. Discharge papers given to paramedics for transport.

## 2017-06-14 NOTE — CM/SW NOTE
Followed up with patient and wife and bedside, patient's wife requesting palliative care consult. Dr. Marleta Sacks paged and order received.     Davida Alanis RN, [facility]/  [phone]

## 2017-06-15 ENCOUNTER — SNF VISIT (OUTPATIENT)
Dept: INTERNAL MEDICINE CLINIC | Age: 82
End: 2017-06-15

## 2017-06-15 ENCOUNTER — OFFICE VISIT (OUTPATIENT)
Dept: WOUND CARE | Facility: HOSPITAL | Age: 82
End: 2017-06-15
Attending: PODIATRIST
Payer: MEDICARE

## 2017-06-15 VITALS
TEMPERATURE: 98 F | RESPIRATION RATE: 20 BRPM | DIASTOLIC BLOOD PRESSURE: 70 MMHG | OXYGEN SATURATION: 95 % | SYSTOLIC BLOOD PRESSURE: 104 MMHG | WEIGHT: 231 LBS | HEART RATE: 94 BPM | BODY MASS INDEX: 31 KG/M2

## 2017-06-15 DIAGNOSIS — R53.81 PHYSICAL DECONDITIONING: ICD-10-CM

## 2017-06-15 DIAGNOSIS — Q82.0 HEREDITARY LYMPHEDEMA: ICD-10-CM

## 2017-06-15 DIAGNOSIS — M79.89 SWELLING OF LOWER EXTREMITY: ICD-10-CM

## 2017-06-15 DIAGNOSIS — L97.511 NON-PRESSURE CHRONIC ULCER OF OTHER PART OF RIGHT FOOT LIMITED TO BREAKDOWN OF SKIN (HCC): Primary | ICD-10-CM

## 2017-06-15 DIAGNOSIS — E11.40 TYPE 2 DIABETES MELLITUS WITH DIABETIC NEUROPATHY, WITHOUT LONG-TERM CURRENT USE OF INSULIN (HCC): Primary | ICD-10-CM

## 2017-06-15 DIAGNOSIS — J18.9 PNEUMONIA OF LOWER LOBE DUE TO INFECTIOUS ORGANISM, UNSPECIFIED LATERALITY: ICD-10-CM

## 2017-06-15 PROCEDURE — 99214 OFFICE O/P EST MOD 30 MIN: CPT

## 2017-06-15 PROCEDURE — 99309 SBSQ NF CARE MODERATE MDM 30: CPT | Performed by: NURSE PRACTITIONER

## 2017-06-15 NOTE — PROGRESS NOTES
Staice Trammell  : 1935  Age 80year old  male patient is admitted to Facility: [facility] for [facility] date:  17    Discharge date to [facility]:  17  ELOS:  10-13 days  Anticipated discharge date:  17  Lace score of 77 Cap Take 1 capsule (300 mg total) by mouth 2 (two) times daily. Disp: 10 capsule Rfl: 0   Blood Glucose Monitoring Suppl ([employer] CONTOUR NEXT MONITOR) w/Device Does not apply Kit 1 Device by Does not apply route daily.  Disp: 1 kit Rfl: 0   EVELIA MICROLET LA EYES:no visual complaints or deficits  HENT: denies nasal congestion, sinus pain or sore throat;  RESPIRATORY: denies shortness of breath, wheezing or cough   CARDIOVASCULAR:denies chest pain, no palpitations , denies syncope, denies orthopnea, denies co chronic sig edema   NEUROLOGIC: intact; no sensorimotor deficit, cranial nerves intact II-XII, follows commands  PSYCHIATRIC: alert and oriented x 3; affect appropriate    DIAGNOSTICS REVIEWED AT THIS VISIT:    Lab Results  Component Value Date   * 2. Ditropan 5mg po TID    DM II  1. Holding Metformin d/t elevated Cr per hospitalist  2. Glipizide 5mg po BID before meals  3. Accuchecks-daily per previous plan  4. Change to QID if symptomatic/not within normal parameters    HTN  1.  Lisinopril on hold

## 2017-06-15 NOTE — CM/SW NOTE
06/15/17 0800   Discharge disposition   Discharged to: Skilled Nurs   Name [facility]ill[facility]   Discharge transportation [facility]     Pt dc'd on 6/14

## 2017-06-16 NOTE — PROGRESS NOTES
Cecille Lozano  : 1935  Age 80year old  male patient is admitted to [facility]  for M[address]ni 12. Chief complaint: He wants to get from w/c to bed.     HPI of hospital admission:  Patient is a 80year old male with PMH sig for DMII, HTN, depression, de well.     Past Medical History:    ANEMIA                                                        DIABETES                                                      HYPERTENSION                                                  Type II or unspecified type diabete MG Oral Tab, Take 1 tablet (2 mg total) by mouth daily. , Disp: 30 tablet, Rfl: 6  POTASSIUM CHLORIDE ER 20 MEQ Oral Tab CR, TAKE 1 TABLET(20 MEQ) BY MOUTH TWICE DAILY, Disp: 180 tablet, Rfl: 1  alprazolam 0.25 MG Oral Tab, TK 1 T PO QHS PRN FOR SLEEP OR AN suspicious lesions  WOUND: na  EYES: PERRLA, EOMI, sclera anicteric, conjunctiva normal; there is no nystagmus, no drainage from eyes  HENT: normocephalic; normal nose, no nasal drainage, mucous membranes pink, moist, pharynx no exudate, no visible cerumen patient. Patient and wife  states a correct understanding of  the above and states agreement  with the plan.

## 2017-06-19 ENCOUNTER — SNF VISIT (OUTPATIENT)
Dept: INTERNAL MEDICINE CLINIC | Age: 82
End: 2017-06-19

## 2017-06-19 DIAGNOSIS — E11.319 TYPE 2 DIABETES MELLITUS WITH RETINOPATHY WITHOUT MACULAR EDEMA, WITHOUT LONG-TERM CURRENT USE OF INSULIN, UNSPECIFIED LATERALITY, UNSPECIFIED RETINOPATHY SEVERITY (HCC): ICD-10-CM

## 2017-06-19 DIAGNOSIS — R53.81 PHYSICAL DECONDITIONING: Primary | ICD-10-CM

## 2017-06-19 NOTE — PROGRESS NOTES
Mitra Herbert : 1935 Age 80year old male patient is admitted to [facility] for M[address]ni 12. Chief complaint:   No verbalized c/o.     HPI of hospital admission:   Patient is a 80year old male with PMH sig for DMII, HTN, depression, dementia, Katherin well.     HPI:  Occasional agitation, wife comes in to settle him down.     Past Medical History:   ANEMIA   DIABETES   HYPERTENSION   Type II or unspecified type diabetes mellitus *   Comment:dx NIDDM 3yrs ago   Depression   Dementia   Peripheral vascular Oxybutynin Chloride 5 MG Oral Tab, Take 1 tablet (5 mg total) by mouth 3 (three) times daily. , Disp: 90 tablet, Rfl: 3   Memantine HCl (NAMENDA) 10 MG Oral Tab, Take 1 tablet (10 mg total) by mouth 2 (two) times daily. , Disp: 60 tablet, Rfl: 5   Multiple understanding of the above and states agreement with the plan.

## 2017-06-20 ENCOUNTER — SNF VISIT (OUTPATIENT)
Dept: INTERNAL MEDICINE CLINIC | Age: 82
End: 2017-06-20

## 2017-06-20 VITALS
SYSTOLIC BLOOD PRESSURE: 118 MMHG | HEART RATE: 80 BPM | RESPIRATION RATE: 18 BRPM | DIASTOLIC BLOOD PRESSURE: 70 MMHG | TEMPERATURE: 98 F | OXYGEN SATURATION: 95 %

## 2017-06-20 DIAGNOSIS — R60.0 EDEMA EXTREMITIES: ICD-10-CM

## 2017-06-20 DIAGNOSIS — E11.40 TYPE 2 DIABETES MELLITUS WITH DIABETIC NEUROPATHY, WITHOUT LONG-TERM CURRENT USE OF INSULIN (HCC): ICD-10-CM

## 2017-06-20 DIAGNOSIS — R53.81 PHYSICAL DECONDITIONING: Primary | ICD-10-CM

## 2017-06-20 DIAGNOSIS — J18.9 PNEUMONIA OF LOWER LOBE DUE TO INFECTIOUS ORGANISM, UNSPECIFIED LATERALITY: ICD-10-CM

## 2017-06-20 PROCEDURE — 99308 SBSQ NF CARE LOW MDM 20: CPT | Performed by: NURSE PRACTITIONER

## 2017-06-20 NOTE — PROGRESS NOTES
Jovan Hurt, 104/1935, 80year old, male    Chief Complaint:  Patient presents with:  Weakness  Abnormal Labs  Diabetes     [facility] Admit date:  6/9/17    Discharge date to [facility]:  6/14/17  ELOS:  10-13 days  Anticipated discharge date:  6/24/17 spouse to make arrangements at home. This has been conveyed to the [place] [address] team.  Hyponatremia improved per labs 6/19/17. Blood glucoses well managed, will continue to monitor.   Lymphedema persists to bilat lower extrems, non-pitting, non painful/tender per Final  Hemoglobin 15.4 gm/dL 13.2-18.0 Final  Hematocrit 46.5 percent 41.0-55.0 Final  MCV 91 fL 82-99 Final  MCH 30 pg 27-33 Final  MCHC 33 percent 31-34 Final  Platelets 354 k/cu mm 150-450 Final  MPV 10.6 fL 9.8-12.7 Final  RDW-CV 15 percent 11-15 Final Healthcare Facility SNF sonia (Manual)   strike-out 6/16/2017 6:11 .0 mg/dL [facility]   strike-out 6/15/2017 6:39 .0 mg/dL [facility]       SEE PLAN BELOW  Physical Deconditi

## 2017-06-22 ENCOUNTER — SNF VISIT (OUTPATIENT)
Dept: INTERNAL MEDICINE CLINIC | Age: 82
End: 2017-06-22

## 2017-06-22 VITALS
TEMPERATURE: 97 F | SYSTOLIC BLOOD PRESSURE: 118 MMHG | RESPIRATION RATE: 20 BRPM | DIASTOLIC BLOOD PRESSURE: 66 MMHG | OXYGEN SATURATION: 95 % | HEART RATE: 85 BPM

## 2017-06-22 DIAGNOSIS — I89.0 LYMPHEDEMA OF BOTH LOWER EXTREMITIES: ICD-10-CM

## 2017-06-22 DIAGNOSIS — R53.81 PHYSICAL DECONDITIONING: Primary | ICD-10-CM

## 2017-06-22 PROCEDURE — 99307 SBSQ NF CARE SF MDM 10: CPT | Performed by: NURSE PRACTITIONER

## 2017-06-22 NOTE — PROGRESS NOTES
Braden Tex, 104/1935, 80year old, male    Chief Complaint:  Patient presents with:  Fatigue: generalized  Follow - Up: r/t BP, bld glucoses, pna     [place] [facility] Admit date:  6/9/17    Discharge date to [facility]:  6/14/17  ELOS:  10-13 days  Anticipat lift for stairs and living on the first floor. She claims she called the company the other day too late and yesterday too early before they opened and doesn't have the phone number with her today at the facility.   Encouraged her to follow-up with the [facility] appropriate/quiet    DIAGNOSTICS REVIEWED AT THIS VISIT: None    SEE PLAN BELOW  Physical Deconditioning/Weakness  1. PT/OT at [place] [id number]  2. Anticipated discharge of 6/24 to home with wife  3. Palliative care   4.  Wife to evaluate stair lift/chair for home or pt

## 2017-06-26 ENCOUNTER — SNF VISIT (OUTPATIENT)
Dept: INTERNAL MEDICINE CLINIC | Age: 82
End: 2017-06-26

## 2017-06-26 DIAGNOSIS — R53.81 PHYSICAL DECONDITIONING: Primary | ICD-10-CM

## 2017-06-26 DIAGNOSIS — I48.19 PERSISTENT ATRIAL FIBRILLATION (HCC): ICD-10-CM

## 2017-06-26 NOTE — PROGRESS NOTES
Mitra Herbert : 1935 Age 80year old male patient is admitted to [facility] for M[address]ni 12. Chief complaint:   No c/o  Today's HPI:  Seen by Physiatry who advises consideration of Palliative Care.     HPI of hospital admission:   Patient is a 80 yea applied for long term care and come to [facility] as well.        Past Medical History:   ANEMIA   DIABETES   HYPERTENSION   Type II or unspecified type diabetes mellitus *   Comment:dx NIDDM 3yrs ago   Depression   Dementia   Peripheral vascu Rfl: 1   Oxybutynin Chloride 5 MG Oral Tab, Take 1 tablet (5 mg total) by mouth 3 (three) times daily. , Disp: 90 tablet, Rfl: 3   Memantine HCl (NAMENDA) 10 MG Oral Tab, Take 1 tablet (10 mg total) by mouth 2 (two) times daily. , Disp: 60 tablet, Rfl: 5   M concepts. Richard Giordano MD   06/26/2017 3:02 PM     Over half of the office visit, which lasted over thirty minutes, was spent counselling the patient.      Patient  states a correct understanding of the above and states agreement with the plan

## 2017-06-27 ENCOUNTER — SNF DISCHARGE (OUTPATIENT)
Dept: INTERNAL MEDICINE CLINIC | Age: 82
End: 2017-06-27

## 2017-06-27 VITALS
OXYGEN SATURATION: 94 % | HEART RATE: 60 BPM | TEMPERATURE: 98 F | DIASTOLIC BLOOD PRESSURE: 68 MMHG | RESPIRATION RATE: 20 BRPM | SYSTOLIC BLOOD PRESSURE: 122 MMHG

## 2017-06-27 DIAGNOSIS — R53.81 PHYSICAL DECONDITIONING: Primary | ICD-10-CM

## 2017-06-27 DIAGNOSIS — I89.0 LYMPHEDEMA OF BOTH LOWER EXTREMITIES: ICD-10-CM

## 2017-06-27 DIAGNOSIS — I10 ESSENTIAL HYPERTENSION: ICD-10-CM

## 2017-06-27 PROCEDURE — 99316 NF DSCHRG MGMT 30 MIN+: CPT | Performed by: NURSE PRACTITIONER

## 2017-06-27 RX ORDER — ACETAMINOPHEN 325 MG/1
650 TABLET ORAL EVERY 4 HOURS PRN
COMMUNITY

## 2017-06-27 NOTE — PROGRESS NOTES
Amy Roldan, [phone]/1935, 80year old, male is being discharged from Facility: [address] [place] [address]    Date of Admission:  6/14/17    Date of Discharge:  6/29/17                               Admitting Diagnoses:  PNA, hyponatremia, DM II, exam  RESPIRATORY:no use of accessory muscles, clear, diminished to bases  CARDIOVASCULAR: S1, S2 normal, RRR; no S3, no S4; , no click, no murmur  ABDOMEN:  Obese abdomen, normal active BS+, soft, nondistended; no organomegaly, no masses; no bruits; nonte 0-9 Final  Basophils 1 percent 0-2 Final  Neutrophil Count 4.2 k/cu mm 1.1-6.0 Final  Lymphocyte Count 1.3 k/cu mm 0.7-3.4 Final  Monocyte Count 0.6 k/cu mm 0.3-1.0 Final  Eosinophil Count 0.6 k/cu mm 0.0-0.6 Final  Basophil Count 0.1 k/cu mm 0.0-0.1 Final per previous plan  4. Change to QID if symptomatic/not within normal parameters    HTN  1. Lisinopril on hold per hospitalist rec  2. Monitor BP TID   3. Monitor Cr, restart if Cr improves and BP increases  4.  BP WNL, no Lisinopril     Abnormal Labs--hypon

## 2017-10-31 PROBLEM — N18.2 CKD (CHRONIC KIDNEY DISEASE) STAGE 2, GFR 60-89 ML/MIN: Status: ACTIVE | Noted: 2017-01-01

## 2017-10-31 PROBLEM — E87.6 HYPOKALEMIA: Status: ACTIVE | Noted: 2017-01-01

## 2017-10-31 PROBLEM — I50.33 ACUTE ON CHRONIC DIASTOLIC CONGESTIVE HEART FAILURE (HCC): Status: ACTIVE | Noted: 2017-01-01

## 2018-01-01 ENCOUNTER — APPOINTMENT (OUTPATIENT)
Dept: CT IMAGING | Facility: HOSPITAL | Age: 83
DRG: 602 | End: 2018-01-01
Attending: EMERGENCY MEDICINE
Payer: MEDICARE

## 2018-01-01 ENCOUNTER — HOSPITAL ENCOUNTER (INPATIENT)
Facility: HOSPITAL | Age: 83
LOS: 3 days | Discharge: HOSPICE | DRG: 602 | End: 2018-01-01
Attending: EMERGENCY MEDICINE | Admitting: INTERNAL MEDICINE
Payer: MEDICARE

## 2018-01-01 ENCOUNTER — APPOINTMENT (OUTPATIENT)
Dept: GENERAL RADIOLOGY | Facility: HOSPITAL | Age: 83
DRG: 602 | End: 2018-01-01
Payer: MEDICARE

## 2018-01-01 VITALS
OXYGEN SATURATION: 95 % | RESPIRATION RATE: 19 BRPM | TEMPERATURE: 98 F | WEIGHT: 255 LBS | DIASTOLIC BLOOD PRESSURE: 93 MMHG | HEART RATE: 78 BPM | BODY MASS INDEX: 35 KG/M2 | SYSTOLIC BLOOD PRESSURE: 150 MMHG

## 2018-01-01 DIAGNOSIS — L03.116 CELLULITIS OF LEFT LOWER EXTREMITY: ICD-10-CM

## 2018-01-01 DIAGNOSIS — R77.8 ELEVATED TROPONIN: ICD-10-CM

## 2018-01-01 DIAGNOSIS — R41.82 ALTERED MENTAL STATUS, UNSPECIFIED ALTERED MENTAL STATUS TYPE: Primary | ICD-10-CM

## 2018-01-01 DIAGNOSIS — R73.9 HYPERGLYCEMIA: ICD-10-CM

## 2018-01-01 DIAGNOSIS — I49.3 PVC (PREMATURE VENTRICULAR CONTRACTION): ICD-10-CM

## 2018-01-01 LAB
ALBUMIN SERPL-MCNC: 2.6 G/DL (ref 3.5–4.8)
ALP LIVER SERPL-CCNC: 104 U/L (ref 45–117)
ALT SERPL-CCNC: 21 U/L (ref 17–63)
APTT PPP: 31.5 SECONDS (ref 25–34)
AST SERPL-CCNC: 19 U/L (ref 15–41)
ATRIAL RATE: 105 BPM
ATRIAL RATE: 85 BPM
ATRIAL RATE: 99 BPM
BASOPHILS # BLD AUTO: 0.03 X10(3) UL (ref 0–0.1)
BASOPHILS # BLD AUTO: 0.05 X10(3) UL (ref 0–0.1)
BASOPHILS NFR BLD AUTO: 0.4 %
BASOPHILS NFR BLD AUTO: 0.7 %
BILIRUB SERPL-MCNC: 0.5 MG/DL (ref 0.1–2)
BILIRUB UR QL STRIP.AUTO: NEGATIVE
BUN BLD-MCNC: 14 MG/DL (ref 8–20)
BUN BLD-MCNC: 15 MG/DL (ref 8–20)
BUN BLD-MCNC: 18 MG/DL (ref 8–20)
CALCIUM BLD-MCNC: 8.3 MG/DL (ref 8.3–10.3)
CALCIUM BLD-MCNC: 8.6 MG/DL (ref 8.3–10.3)
CALCIUM BLD-MCNC: 8.9 MG/DL (ref 8.3–10.3)
CHLORIDE: 101 MMOL/L (ref 101–111)
CHLORIDE: 102 MMOL/L (ref 101–111)
CHLORIDE: 97 MMOL/L (ref 101–111)
CLARITY UR REFRACT.AUTO: CLEAR
CO2: 27 MMOL/L (ref 22–32)
CO2: 27 MMOL/L (ref 22–32)
CO2: 28 MMOL/L (ref 22–32)
COLOR UR AUTO: YELLOW
CREAT BLD-MCNC: 1.18 MG/DL (ref 0.7–1.3)
CREAT BLD-MCNC: 1.18 MG/DL (ref 0.7–1.3)
CREAT BLD-MCNC: 1.35 MG/DL (ref 0.7–1.3)
EOSINOPHIL # BLD AUTO: 0.19 X10(3) UL (ref 0–0.3)
EOSINOPHIL # BLD AUTO: 0.21 X10(3) UL (ref 0–0.3)
EOSINOPHIL NFR BLD AUTO: 2.7 %
EOSINOPHIL NFR BLD AUTO: 2.8 %
ERYTHROCYTE [DISTWIDTH] IN BLOOD BY AUTOMATED COUNT: 13.1 % (ref 11.5–16)
ERYTHROCYTE [DISTWIDTH] IN BLOOD BY AUTOMATED COUNT: 13.4 % (ref 11.5–16)
EST. AVERAGE GLUCOSE BLD GHB EST-MCNC: 252 MG/DL (ref 68–126)
GLUCOSE BLD-MCNC: 158 MG/DL (ref 65–99)
GLUCOSE BLD-MCNC: 163 MG/DL (ref 65–99)
GLUCOSE BLD-MCNC: 165 MG/DL (ref 70–99)
GLUCOSE BLD-MCNC: 167 MG/DL (ref 65–99)
GLUCOSE BLD-MCNC: 176 MG/DL (ref 65–99)
GLUCOSE BLD-MCNC: 188 MG/DL (ref 65–99)
GLUCOSE BLD-MCNC: 189 MG/DL (ref 65–99)
GLUCOSE BLD-MCNC: 197 MG/DL (ref 70–99)
GLUCOSE BLD-MCNC: 215 MG/DL (ref 65–99)
GLUCOSE BLD-MCNC: 215 MG/DL (ref 65–99)
GLUCOSE BLD-MCNC: 216 MG/DL (ref 65–99)
GLUCOSE BLD-MCNC: 222 MG/DL (ref 65–99)
GLUCOSE BLD-MCNC: 245 MG/DL (ref 65–99)
GLUCOSE BLD-MCNC: 263 MG/DL (ref 65–99)
GLUCOSE BLD-MCNC: 269 MG/DL (ref 70–99)
GLUCOSE UR STRIP.AUTO-MCNC: >=500 MG/DL
HAV IGM SER QL: 1.6 MG/DL (ref 1.7–3)
HAV IGM SER QL: 1.7 MG/DL (ref 1.7–3)
HAV IGM SER QL: 1.8 MG/DL (ref 1.7–3)
HBA1C MFR BLD HPLC: 10.4 % (ref ?–5.7)
HCT VFR BLD AUTO: 40.4 % (ref 37–53)
HCT VFR BLD AUTO: 43.6 % (ref 37–53)
HGB BLD-MCNC: 13.2 G/DL (ref 13–17)
HGB BLD-MCNC: 14.2 G/DL (ref 13–17)
IMMATURE GRANULOCYTE COUNT: 0.03 X10(3) UL (ref 0–1)
IMMATURE GRANULOCYTE COUNT: 0.04 X10(3) UL (ref 0–1)
IMMATURE GRANULOCYTE RATIO %: 0.4 %
IMMATURE GRANULOCYTE RATIO %: 0.5 %
INR BLD: 1.11 (ref 0.89–1.11)
KETONES UR STRIP.AUTO-MCNC: NEGATIVE MG/DL
LACTIC ACID: 1 MMOL/L (ref 0.5–2)
LEUKOCYTE ESTERASE UR QL STRIP.AUTO: NEGATIVE
LYMPHOCYTES # BLD AUTO: 1.08 X10(3) UL (ref 0.9–4)
LYMPHOCYTES # BLD AUTO: 1.1 X10(3) UL (ref 0.9–4)
LYMPHOCYTES NFR BLD AUTO: 14.7 %
LYMPHOCYTES NFR BLD AUTO: 15.4 %
M PROTEIN MFR SERPL ELPH: 7.7 G/DL (ref 6.1–8.3)
MCH RBC QN AUTO: 30.3 PG (ref 27–33.2)
MCH RBC QN AUTO: 30.3 PG (ref 27–33.2)
MCHC RBC AUTO-ENTMCNC: 32.6 G/DL (ref 31–37)
MCHC RBC AUTO-ENTMCNC: 32.7 G/DL (ref 31–37)
MCV RBC AUTO: 92.7 FL (ref 80–99)
MCV RBC AUTO: 93.2 FL (ref 80–99)
MONOCYTES # BLD AUTO: 0.78 X10(3) UL (ref 0.1–1)
MONOCYTES # BLD AUTO: 0.83 X10(3) UL (ref 0.1–1)
MONOCYTES NFR BLD AUTO: 10.4 %
MONOCYTES NFR BLD AUTO: 11.8 %
NEUTROPHIL ABS PRELIM: 4.83 X10 (3) UL (ref 1.3–6.7)
NEUTROPHIL ABS PRELIM: 5.32 X10 (3) UL (ref 1.3–6.7)
NEUTROPHILS # BLD AUTO: 4.83 X10(3) UL (ref 1.3–6.7)
NEUTROPHILS # BLD AUTO: 5.32 X10(3) UL (ref 1.3–6.7)
NEUTROPHILS NFR BLD AUTO: 69 %
NEUTROPHILS NFR BLD AUTO: 71.2 %
NITRITE UR QL STRIP.AUTO: NEGATIVE
P AXIS: 61 DEGREES
P AXIS: 66 DEGREES
P AXIS: 91 DEGREES
P-R INTERVAL: 166 MS
P-R INTERVAL: 168 MS
P-R INTERVAL: 176 MS
PH UR STRIP.AUTO: 7 [PH] (ref 4.5–8)
PLATELET # BLD AUTO: 197 10(3)UL (ref 150–450)
PLATELET # BLD AUTO: 211 10(3)UL (ref 150–450)
POTASSIUM SERPL-SCNC: 3.6 MMOL/L (ref 3.6–5.1)
POTASSIUM SERPL-SCNC: 4 MMOL/L (ref 3.6–5.1)
POTASSIUM SERPL-SCNC: 4.1 MMOL/L (ref 3.6–5.1)
POTASSIUM SERPL-SCNC: 4.1 MMOL/L (ref 3.6–5.1)
PRO-BETA NATRIURETIC PEPTIDE: 581 PG/ML (ref ?–450)
PROT UR STRIP.AUTO-MCNC: NEGATIVE MG/DL
PSA SERPL DL<=0.01 NG/ML-MCNC: 14.4 SECONDS (ref 12–14.3)
Q-T INTERVAL: 394 MS
Q-T INTERVAL: 398 MS
Q-T INTERVAL: 434 MS
QRS DURATION: 134 MS
QRS DURATION: 134 MS
QRS DURATION: 136 MS
QTC CALCULATION (BEZET): 510 MS
QTC CALCULATION (BEZET): 520 MS
QTC CALCULATION (BEZET): 522 MS
R AXIS: -43 DEGREES
R AXIS: -47 DEGREES
R AXIS: -50 DEGREES
RBC # BLD AUTO: 4.36 X10(6)UL (ref 3.8–5.8)
RBC # BLD AUTO: 4.68 X10(6)UL (ref 3.8–5.8)
RED CELL DISTRIBUTION WIDTH-SD: 44.6 FL (ref 35.1–46.3)
RED CELL DISTRIBUTION WIDTH-SD: 45.3 FL (ref 35.1–46.3)
SODIUM SERPL-SCNC: 133 MMOL/L (ref 136–144)
SODIUM SERPL-SCNC: 135 MMOL/L (ref 136–144)
SODIUM SERPL-SCNC: 139 MMOL/L (ref 136–144)
SP GR UR STRIP.AUTO: 1.01 (ref 1–1.03)
T AXIS: 11 DEGREES
T AXIS: 16 DEGREES
T AXIS: 18 DEGREES
TROPONIN: 0.11 NG/ML (ref ?–0.05)
TROPONIN: 0.11 NG/ML (ref ?–0.05)
TSI SER-ACNC: 1.97 MIU/ML (ref 0.35–5.5)
UROBILINOGEN UR STRIP.AUTO-MCNC: <2 MG/DL
VENTRICULAR RATE: 105 BPM
VENTRICULAR RATE: 87 BPM
VENTRICULAR RATE: 99 BPM
WBC # BLD AUTO: 7 X10(3) UL (ref 4–13)
WBC # BLD AUTO: 7.5 X10(3) UL (ref 4–13)

## 2018-01-01 PROCEDURE — 99221 1ST HOSP IP/OBS SF/LOW 40: CPT | Performed by: NURSE PRACTITIONER

## 2018-01-01 PROCEDURE — 70450 CT HEAD/BRAIN W/O DYE: CPT | Performed by: EMERGENCY MEDICINE

## 2018-01-01 PROCEDURE — 71045 X-RAY EXAM CHEST 1 VIEW: CPT

## 2018-01-01 RX ORDER — MEMANTINE HYDROCHLORIDE 10 MG/1
10 TABLET ORAL 2 TIMES DAILY
Status: DISCONTINUED | OUTPATIENT
Start: 2018-01-01 | End: 2018-01-01

## 2018-01-01 RX ORDER — HALOPERIDOL 5 MG/ML
1 INJECTION INTRAMUSCULAR EVERY 4 HOURS PRN
Status: DISCONTINUED | OUTPATIENT
Start: 2018-01-01 | End: 2018-01-01

## 2018-01-01 RX ORDER — DOCUSATE SODIUM 100 MG/1
100 CAPSULE, LIQUID FILLED ORAL 2 TIMES DAILY
Status: DISCONTINUED | OUTPATIENT
Start: 2018-01-01 | End: 2018-01-01

## 2018-01-01 RX ORDER — MAGNESIUM OXIDE 400 MG (241.3 MG MAGNESIUM) TABLET
400 TABLET ONCE
Status: COMPLETED | OUTPATIENT
Start: 2018-01-01 | End: 2018-01-01

## 2018-01-01 RX ORDER — PSEUDOEPHEDRINE HCL 30 MG
100 TABLET ORAL 2 TIMES DAILY
Qty: 30 CAPSULE | Refills: 0 | Status: SHIPPED | OUTPATIENT
Start: 2018-01-01

## 2018-01-01 RX ORDER — SODIUM PHOSPHATE, DIBASIC AND SODIUM PHOSPHATE, MONOBASIC 7; 19 G/133ML; G/133ML
1 ENEMA RECTAL ONCE AS NEEDED
Status: DISCONTINUED | OUTPATIENT
Start: 2018-01-01 | End: 2018-01-01

## 2018-01-01 RX ORDER — BISACODYL 10 MG
10 SUPPOSITORY, RECTAL RECTAL
Status: DISCONTINUED | OUTPATIENT
Start: 2018-01-01 | End: 2018-01-01

## 2018-01-01 RX ORDER — AMMONIUM LACTATE 12 G/100G
LOTION TOPICAL AS NEEDED
Status: DISCONTINUED | OUTPATIENT
Start: 2018-01-01 | End: 2018-01-01

## 2018-01-01 RX ORDER — POLYETHYLENE GLYCOL 3350 17 G/17G
17 POWDER, FOR SOLUTION ORAL DAILY PRN
Status: DISCONTINUED | OUTPATIENT
Start: 2018-01-01 | End: 2018-01-01

## 2018-01-01 RX ORDER — DILTIAZEM HYDROCHLORIDE 5 MG/ML
10 INJECTION INTRAVENOUS ONCE
Status: DISCONTINUED | OUTPATIENT
Start: 2018-01-01 | End: 2018-01-01

## 2018-01-01 RX ORDER — ACETAMINOPHEN 325 MG/1
650 TABLET ORAL EVERY 6 HOURS PRN
Status: DISCONTINUED | OUTPATIENT
Start: 2018-01-01 | End: 2018-01-01

## 2018-01-01 RX ORDER — METOPROLOL TARTRATE 5 MG/5ML
5 INJECTION INTRAVENOUS EVERY 6 HOURS
Status: DISCONTINUED | OUTPATIENT
Start: 2018-01-01 | End: 2018-01-01

## 2018-01-01 RX ORDER — ENOXAPARIN SODIUM 100 MG/ML
40 INJECTION SUBCUTANEOUS DAILY
Status: DISCONTINUED | OUTPATIENT
Start: 2018-01-01 | End: 2018-01-01

## 2018-01-01 RX ORDER — BUMETANIDE 2 MG/1
2 TABLET ORAL 2 TIMES DAILY
Status: DISCONTINUED | OUTPATIENT
Start: 2018-01-01 | End: 2018-01-01

## 2018-01-01 RX ORDER — DEXTROSE MONOHYDRATE 25 G/50ML
50 INJECTION, SOLUTION INTRAVENOUS
Status: DISCONTINUED | OUTPATIENT
Start: 2018-01-01 | End: 2018-01-01

## 2018-01-01 RX ORDER — CEPHALEXIN 250 MG/1
250 CAPSULE ORAL 4 TIMES DAILY
Qty: 20 CAPSULE | Refills: 0 | Status: SHIPPED | OUTPATIENT
Start: 2018-01-01 | End: 2018-01-01

## 2018-01-01 RX ORDER — DILTIAZEM HYDROCHLORIDE 5 MG/ML
10 INJECTION INTRAVENOUS EVERY 2 HOUR PRN
Status: DISCONTINUED | OUTPATIENT
Start: 2018-01-01 | End: 2018-01-01

## 2018-01-01 RX ORDER — AMMONIUM LACTATE 12 G/100G
1 LOTION TOPICAL AS NEEDED
Qty: 57 G | Refills: 0 | Status: SHIPPED | OUTPATIENT
Start: 2018-01-01

## 2018-01-01 RX ORDER — CEPHALEXIN 500 MG/1
500 CAPSULE ORAL EVERY 6 HOURS SCHEDULED
Status: DISCONTINUED | OUTPATIENT
Start: 2018-01-01 | End: 2018-01-01

## 2018-01-01 RX ORDER — ALPRAZOLAM 0.25 MG/1
0.25 TABLET ORAL NIGHTLY PRN
COMMUNITY

## 2018-01-01 RX ORDER — LORAZEPAM 2 MG/ML
0.5 INJECTION INTRAMUSCULAR ONCE
Status: COMPLETED | OUTPATIENT
Start: 2018-01-01 | End: 2018-01-01

## 2018-01-01 RX ORDER — LORAZEPAM 2 MG/ML
INJECTION INTRAMUSCULAR
Status: COMPLETED
Start: 2018-01-01 | End: 2018-01-01

## 2018-01-01 RX ORDER — OXYBUTYNIN CHLORIDE 5 MG/1
5 TABLET ORAL 3 TIMES DAILY
Status: DISCONTINUED | OUTPATIENT
Start: 2018-01-01 | End: 2018-01-01

## 2018-01-01 RX ORDER — POTASSIUM CHLORIDE 20 MEQ/1
40 TABLET, EXTENDED RELEASE ORAL EVERY 4 HOURS
Status: COMPLETED | OUTPATIENT
Start: 2018-01-01 | End: 2018-01-01

## 2018-01-01 RX ORDER — ONDANSETRON 2 MG/ML
4 INJECTION INTRAMUSCULAR; INTRAVENOUS EVERY 6 HOURS PRN
Status: DISCONTINUED | OUTPATIENT
Start: 2018-01-01 | End: 2018-01-01

## 2018-01-01 RX ORDER — FAMOTIDINE 20 MG/1
20 TABLET ORAL NIGHTLY
Status: DISCONTINUED | OUTPATIENT
Start: 2018-01-01 | End: 2018-01-01

## 2018-01-01 RX ORDER — POTASSIUM CHLORIDE 20 MEQ/1
20 TABLET, EXTENDED RELEASE ORAL 2 TIMES DAILY
COMMUNITY
End: 2018-01-01

## 2018-02-21 PROBLEM — R41.0 CONFUSION: Status: ACTIVE | Noted: 2018-01-01

## 2018-02-21 PROBLEM — I49.3 PVC (PREMATURE VENTRICULAR CONTRACTION): Status: ACTIVE | Noted: 2018-01-01

## 2018-02-21 PROBLEM — R41.82 ALTERED MENTAL STATUS: Status: ACTIVE | Noted: 2018-01-01

## 2018-02-21 PROBLEM — R73.9 HYPERGLYCEMIA: Status: ACTIVE | Noted: 2018-01-01

## 2018-02-21 PROBLEM — R41.82 ALTERED MENTAL STATUS, UNSPECIFIED ALTERED MENTAL STATUS TYPE: Status: ACTIVE | Noted: 2018-01-01

## 2018-02-21 PROBLEM — R77.8 ELEVATED TROPONIN: Status: ACTIVE | Noted: 2018-01-01

## 2018-02-21 NOTE — ED INITIAL ASSESSMENT (HPI)
Pt here from home with c/o AMS. EMS state family reported has been disoriented, weak, and having difficult speaking for 2-3 days. Hx Parkinson's, dementia, diabetes, HTN.  Family reports pt is A&OX4, but is aroused by verbal stimuli and was less responsive

## 2018-02-22 PROBLEM — Z71.89 GOALS OF CARE, COUNSELING/DISCUSSION: Status: ACTIVE | Noted: 2017-06-14

## 2018-02-22 NOTE — PLAN OF CARE
Received patient from ED around 2230  Wife present, completed admission navigator  States patient chokes a lot at home after food or water  Made NPO, speech to see - physician aware    Unable to complete full neuro assessment, pupils equal, sluggish  Patie

## 2018-02-22 NOTE — SLP NOTE
ADULT SWALLOWING EVALUATION    ASSESSMENT    ASSESSMENT/OVERALL IMPRESSION:  Pt was referred for a BSSE to assess diet tolerance/safety, r/o aspiration and recommend a safe diet. Pt was adm AMS, dementia with multiple medical issues.     HPI  ER  / Viral Larry hospitalizations.   After a review of both the comments from spouse and in the chart pt requires minimum of 24hr supervision in the home setting and/or consideration of Hospice Care    Pt was evaluated with thin, puree and a solid consistency at bedside wit History:   Diagnosis Date   • ANEMIA    • Congestive heart disease ([facility] [address]    • Dementia    • Depression    • DIABETES    • HYPERTENSION    • Peripheral vascular disease (HCC)    • Type II or unspecified type diabetes mellitus without mention of complication possible esophageal involvement  Comments:               GOALS  Goal #1 Pt will tolerate mech soft/chopped with nectar thick liquids with supervision and assistance by stafff with 95% compliance for 1 session  New Goal   Goal #2 The patient/family/caregive

## 2018-02-22 NOTE — OCCUPATIONAL THERAPY NOTE
OCCUPATIONAL THERAPY QUICK EVALUATION - INPATIENT    Room Number: 1459/5296-H  Evaluation Date: 2/22/2018     Type of Evaluation: Quick Eval  Presenting Problem: Atrial flutter, lethargy    Physician Order: IP Consult to Occupational Therapy  Reason for Th \"months. \"      OBJECTIVE  Precautions: Cardiac;Bed/chair alarm; Other (Comment) (BLE lymphedema and wounds)  Fall Risk: High fall risk    WEIGHT BEARING RESTRICTION  Weight Bearing Restriction: None                PAIN ASSESSMENT  Ratin          COGNI lethargy, LE cellulitis. Complete medical history and occupational profile noted above. Functional outcome measures completed include AM-PAC. Pt is performing ADL at his baseline level. Recommend discharge back home with assistance with all ADL.  Pt's fam

## 2018-02-22 NOTE — CONSULTS
Consult noted, chart reviewed. Met with spouse at bedside. She is interested in hospice consult. Not sure pt is quite eligible yet, but will have hospice eval.  Code status addressed, DNR. Full note to follow.     [address] [facility] Addressed code status, pt now DNR. Symptoms(s): denies complaints    Psychosocial: spouse supportive. Palliative Care  Alan Awad, pager [phone] will complete psychosocial assessment and follow for bedside counseling as needed.     Spirit

## 2018-02-22 NOTE — PHYSICAL THERAPY NOTE
PHYSICAL THERAPY QUICK EVALUATION - INPATIENT    Room Number: 7469/5551-O  Evaluation Date: 2/22/2018  Presenting Problem: AMS  Physician Order: PT Eval and Treat   History related to current admission: Pt was admitted on 2/21 with lethargy, L LE celluli Restriction: None                PAIN ASSESSMENT  Rating: Unable to rate         RANGE OF MOTION AND STRENGTH ASSESSMENT  Upper extremity ROM and strength: see OT eval    Lower extremity ROM is within functional limits except for the following:   Right Hip session/findings; All patient questions and concerns addressed; Alarm set; Family present    ASSESSMENT   Patient is a 80year old male admitted on 2/21/2018 for AMS. Pertinent comorbidities and personal factors impacting therapy include LLE cellulitis.   Fun

## 2018-02-22 NOTE — CONSULTS
[facility] Cardiology Consultation    Gloria Parker Patient Status:  Inpatient    1935 MRN [id number]   [facility] 7NE-A Attending Samia Penn MD   Hosp Day # 1 PCP Glorya Peabody, MD     Reason for Consultation:  Atrial flutter, el he does not drink alcohol or use drugs. Review of Systems:  All systems were reviewed and are negative except as described above in HPI.     Physical Exam:      Temp:  [98.1 °F (36.7 °C)-99.6 °F (37.6 °C)] 98.9 °F (37.2 °C)  Pulse:  [] 97  Resp:  [

## 2018-02-22 NOTE — H&P
[address] Patient Status:  Inpatient    1935 MRN [id number]   [facility] 7NE-A Attending Summer Carson MD   Hosp Day # 1 PCP Devi Quezada MD     Cc: mental status changes    History of Pre QHS PRN FOR SLEEP OR ANXIETY Disp: 30 tablet Rfl: 0 2/21/2018 at 0900   glipiZIDE 10 MG Oral Tab Take 1 tablet (10 mg total) by mouth 2 (two) times daily before meals.  Disp: 60 tablet Rfl: 1 2/21/2018 at 0900   bumetanide 2 MG Oral Tab Take 1 tablet (2 mg bowel sounds  Extremities: significant lymphedema and thickened skin bilaterally, no ulceration  Musculoskeletal: moves all 4 extremities independently, gait deferred as bed bound    Psychiatric: cannot assess      Data Review:     Recent Labs   Lab  02/21 10.4% on 2/21/18  - hold oral diabetic medications while inpatient  - does not use insulin at home  - accu-checks QID, sliding scale insulin as needed    # CKD stage 3  - follows with Dr. Bud Hi as outpatient  - Cr improved from baseline of ~ 1.5--> 1.18  -

## 2018-02-22 NOTE — PLAN OF CARE
Assumed care at 1400  A&Ox1, VSS, NSR with frequent PAC and PVCs  EEG completed  Cardiology and speech consulted. Speech recommending NTL. Aspiration precautions maintained  Palliative and hospice care consulted.  DNR status  BLE edema, legs red, mary scal

## 2018-02-22 NOTE — ED PROVIDER NOTES
Patient Seen in: [facility] Emergency Department    History   Patient presents with:  Altered Mental Status (neurologic)    Stated Complaint: AMS    HPI    Increased dementia symptoms, sleping too much and \"out of it\"- pt falling over from sitting CATARACT      Comment: phaco ou 3-4yrs ago  No date: PARKINSON? ½S DISEASE MG        Smoking status: Former Smoker                                                              Packs/day: 0.50      Years: 46.00        Types: Cigarettes  Smokeless tobacco: Fo Sodium 133 (*)     Chloride 97 (*)     All other components within normal limits   PROTHROMBIN TIME (PT) - Abnormal; Notable for the following:     PT 14.4 (*)     All other components within normal limits   URINALYSIS WITH CULTURE REFLEX - Abnormal; No 99  Rhythm: Sinus Rhythm  Reading: Ventricular rate of 99 sinus rhythm with frequent PVCs, there is a right bundle branch block there is left axis deviation noted, there are Q waves in the inferior leads there are nonspecific EKG changes across the precord CONCLUSION:  Stable moderate to severe chronic deep white matter ischemic     change. No acute hemorrhage.                    Dictated by: Forrest Denver, MD on 2/21/2018 at 20:28         Approved by: Forrest Denver, MD                 XR CHEST AP PO daily worsening of dementia and his generalized weakness.       Admission disposition: 2/21/2018  9:13 PM           Disposition and Plan     Clinical Impression:  Altered mental status, unspecified altered mental status type  (primary encounter diagnosis)

## 2018-02-22 NOTE — PROGRESS NOTES
Pharmacy Note: Renal dose adjustment of Unasyn    Rosa Elena Monroy is a 80year old male who has been prescribed Unasyn. CrCl is 46.3 ml/min and patient is 115.7kg so the dose has been adjusted  to 3gm IV q8h per hospital renal dose adjustment protocol.

## 2018-02-22 NOTE — HOSPICE RN NOTE
3pm -Hospice referral received from [facility]. Plan to meet with patient/wife to discuss patients hospice benefit. Patients chart was reviewed and he does appear to meet hospice criteria for Alzheimer's disease.    Plan to updated patients RN and palliativ

## 2018-02-22 NOTE — CM/SW NOTE
02/22/18 1500   CM/SW Referral Data   Referral Source Social Work (self-referral)   Reason for Referral Discharge planning   Informant Spouse   Patient Info   Patient's Mental Status Confused   Patient's [address]   Number of Levels in Home

## 2018-02-22 NOTE — PROGRESS NOTES
[facility] Note: Renal dose adjustment for Famotidine (Pepcid)  Carl Romo has been prescribed Famotidine (Pepcid) 20 mg every 12 hours. Estimated Creatinine Clearance: 46.3 mL/min (based on SCr of 1.35 mg/dL (H)).     His calculated creat

## 2018-02-23 NOTE — PROCEDURES
[facility], [address] [place] [address]      PATIENT'S NAME: Kae Peguero   ATTENDING PHYSICIAN: Phong Garcia M.D.    PATIENT ACCOUNT #: [de-identified] LOCATION: [address] [place] [address]   MEDICAL RECORD #: [id number] DATE OF BIRTH

## 2018-02-23 NOTE — HOSPICE RN NOTE
Consents for hospice signed, DME/Comfort medications ordered for delivery tonight. Plan is for patient to go home tomorrow and be admitted to hospice there.    Will call Aguila Ornelas to arrange for transportation tomorrow once I hear back from hospice nursing s

## 2018-02-23 NOTE — CONSULTS
Neurology consult NOTE    The reason for consultation:    Altered mental status. HPI:   Hx was obtained through his daughter who is at bed side. She States patient has been progressing with his dementia over the past 6 months.  Has been unable to walk a Special Diet No    Back Care No    Comment: MINIMAL    Exercise No    Bike Helmet Yes    Seat Belt Yes    Self-Exams Yes     Social History Narrative   None on file     No Known Allergies    No current facility-administered medications on file prior to through out. PLANTAR RESPONSE: down going toes bilaterally. IMAGE:   Brain CT showed no acute changes. EEG showed diffuse slowing,  No seizure activities. ASSESSMENT:     1. Worsening mental status on AD.  Multifactorial: , metabolic, renal en

## 2018-02-23 NOTE — PROGRESS NOTES
[facility]  Progress Note    Cecille Lozano Patient Status:  Inpatient    1935 MRN [id number]   [facility] 7NE-A Attending Sharda Gilbert MD   Hosp Day # 2 PCP Yoli Horn MD     Cc: follow up    Subjective:       Wyatt Reina Assessment/Plan:      Cecille Lozano is a 80year old male with PMH significant for T2DM with associated peripheral neuropathy, CKD stage 3, chronic hyponatremia,  anemia of chronic disease, HTN, Parkinson's,  dementia presenting to [facility] for m

## 2018-02-23 NOTE — SLP NOTE
SPEECH DAILY NOTE - INPATIENT    ASSESSMENT & PLAN   ASSESSMENT  Pt and family were seen for dysphagia education and training. Pt has been referred to [address] [place] [address] in the home setting.   Pt had a recent BSSE which revealed no overt s/s of aspiration with s Multiple swallows, Alternate liquids/solids, No straws, Upright 90 degrees, Upright 90 degrees 30 mins after meal, Eliminate distractions, Feed patient, Supervision with meals with max assistance 100 % of the time across 2 sessions.   Discharge

## 2018-02-23 NOTE — PROGRESS NOTES
02/22/18 8676   Clinical Encounter Type   Visited With Patient and family together  (Daughter and PEBBLES at bedside)   Routine Visit Introduction  (Daughter reports [facility])   Continue Visiting No   Patient's

## 2018-02-23 NOTE — PLAN OF CARE
Assumed care @ 1500  A&Ox1 (self only), VSS on RA, tele d/c'd  BLE edematous, red, flaky.  Denies pain  Spouse at bedside    Plan for d/c home @ 1300 2/24 with residential hospice    CARDIOVASCULAR - ADULT    • Maintains optimal cardiac output and hemodynam

## 2018-02-23 NOTE — CONSULTS
[facility]  Report of Inpatient Wound Care Consultation     Gabriela David Patient Status:  Inpatient    1935 MRN [id number]   [facility] 7NE-A Attending Richie Leach MD   Hosp Day # 2 PCP Dann Chamorro MD     REASON FOR CONS --    --    --   14   --    GLU  269*   --   197*   --    --    --   165*   --    CA  8.9   --   8.3   --    --    --   8.6   --    ALB  2.6*   --    --    --    --    --    --    --    TP  7.7   --    --    --    --    --    --    --    PTT  31.5   --

## 2018-02-23 NOTE — PLAN OF CARE
Patient alert and oriented times one. Meds given per MAR in apple sauce. Family at bedside in evening. Patient kept clean and dry. Denies any pain or discomfort. Vital signs stable. Resting comfortably in bed. Call light in reach.     Impaired Functiona

## 2018-02-23 NOTE — PALLIATIVE CARE NOTE
PCSW notified by [facility] that the plan is to treat PNA then d/c home with hospice. [facility] advised PCSW that hospice consents are signed and to notify her of d/c and she will arrange d/c at home.  [facility] to disc

## 2018-02-23 NOTE — PROGRESS NOTES
[facility] Cardiology Progress Note        Karen Clement Patient Status:  Inpatient    1935 MRN [id number]   [facility] 7NE-A Attending Mandeep Godoy MD   Hosp Day # 2 PCP Kassi Pantoja MD     Subjective:  MS sanchez 8.9  8.3  8.6   MG   --   1.6*  1.8   GLU  269*  197*  165*       Recent Labs   Lab  02/21/18   1745   ALT  21   AST  19   ALB  2.6*       Recent Labs   Lab  02/21/18   1745  02/22/18   0445   TROP  0.109*  0.108*       Recent Labs   Lab  02/21/18   1745

## 2018-02-23 NOTE — HOME CARE LIAISON
PTNT CURRENT WITH [facility] EOE 4/15/18  WILL NEED A TUNG ORDER ON OR BEFORE DC    THANKS  Germain Cisneros

## 2018-02-23 NOTE — PLAN OF CARE
Patient alert and oriented x self, no acute distress observed.  Palliative care signed off as pt will be on hospice upon d/c.   @ 0930, per tele monitor tech, pt had 4 beats of VT , went to observe pt, pt was being turned and repositioned by PCT staffs, no

## 2018-02-24 NOTE — PLAN OF CARE
Assumed care @ 0700  A&Ox1, VSS on RA, no tele  Red, flaky to BLE. Lac-hydrin applied  Spouse at bedside. Updated on 84 Priyanka Farrar NOTE    Discharged Home via Ambulance. Accompanied by Support staff  Belongings Taken by patient/family.     C

## 2018-02-24 NOTE — PROGRESS NOTES
[facility] Dosing Service  Antibiotic Dosing    Saadia Barth is a 80year old male for whom pharmacy is dosing keflex for treatment of  cellulitis    Allergies: has No Known Allergies.     Vitals: /77 (BP Location: Right arm)   Pulse 80   Temp 9

## 2018-02-24 NOTE — PROGRESS NOTES
[facility] Cardiology Progress Note        Ankit De La Torre Patient Status:  Inpatient    1935 MRN [id number]   [facility] 7NE-A Attending Asya Gomez MD   Hosp Day # 3 PCP Ulysses Conner, MD     Subjective:  MS sanchez 27.0  27.0   --    BUN  18  15  14   --    CREATSERUM  1.35*  1.18  1.18   --    CA  8.9  8.3  8.6   --    MG   --   1.6*  1.8  1.7   GLU  269*  197*  165*   --        Recent Labs   Lab  02/21/18   1745   ALT  21   AST  19   ALB  2.6*       Recent Labs   L

## 2018-02-24 NOTE — PLAN OF CARE
Alert to name only. Pleasantly confused, but pulled out IV again. Obtained order to change IV abx to PO with pharmacy to dose.     CARDIOVASCULAR - ADULT    • Maintains optimal cardiac output and hemodynamic stability Adequate for Discharge    • Absence

## 2018-02-24 NOTE — DISCHARGE SUMMARY
General Medicine Discharge Summary     Patient ID:  Cecille Lozano  80year old  6/4/1935    Admit date: 2/21/2018    Discharge date and time: 2/24/2018    Attending Physician: Blake Ocampo MD 135     # Dysphagia  - SLP eval appreciated, nectar thick liquids recommended        Consults: IP CONSULT TO CARDIOLOGY  IP CONSULT TO HOSPITALIST  IP CONSULT TO SPIRITUAL CARE  IP CONSULT TO NEUROLOGY  CONSULT TO WOUND OSTOMY  IP CONSULT PALLIATIVE CARE RRR  Lungs: clear bilaterally, no active wheezing  Abdomen: nontender, nondistended, intact BS  Extremities: no pedal edema   Neuro: CN inact, no focal deficits      Total time coordinating care for discharge: Greater than 30 minutes    . Denilson JOHNSON

## 2018-02-26 NOTE — CM/SW NOTE
02/26/18 1000   Discharge disposition   Discharged to: Hospice/Home   Name of 340Nikki Rangel   Discharge transportation [employer]

## (undated) NOTE — ED AVS SNAPSHOT
[facility] Emergency Department    [facility]  [address] [address]    Phone:  [phone]    Fax:  [phone]           Mitra Herbert   MRN: [id number]    Department:  [facility] Emergency Department   Date of Visit:  1/5 Bring a paper prescription for each of these medications    - cephALEXin 500 MG Caps  - furosemide 40 MG Tabs              Discharge Instructions       Change Lasix from 20 mg 3 times daily to 40 mg twice daily.   Continue potassium    Discharge References [facility] Emergency Department. Follow-up care is at the discretion of that Physician. IF THERE IS ANY CHANGE OR WORSENING OF YOUR CONDITION, CALL YOUR PRIMARY CARE PHYSICIAN AT ONCE OR RETURN IMMEDIATELY TO THE EMERGENCY DEPARTMENT.     If you hav harming yourself, contact [facility] at [phone]. - If you don’t have insurance, Gumaro Kumar has partnered with Patient [address] to help you get signed up for insurance coverage.   [facility] view more details from this visit by going to [email or web address]. If you've recently had a stay at the Hospital you can access your discharge instructions in [facility] by going to Visits < Admission Summaries.  If you've been to the Emergency Depar

## (undated) NOTE — IP AVS SNAPSHOT
[address]            (For Outpatient Use Only) Initial Admit Date: 2/21/2018   Inpt/Obs Admit Date: Inpt: 2/21/18 / Obs: N/A   Discharge Date:    Eleazar Calloway:  [de-identified]   MRN: [de-identified]   CSN: [id number]        ENCOUNTER  Patient Hospital Account Financial Class: Medicare    February 24, 2018

## (undated) NOTE — IP AVS SNAPSHOT
[facility] [place]  [address] [place], [address] ~ [phone]                Discharge Summary   6/9/2017    [facility]           Admission Information        Provider Department    6/9/2017 Jewell Gross MD  3ne-PO Wattsing Use as directed to check blood sugar once daily    Jeannette Panda     [    ]    [    ]    [    ]    [    ]       bumetanide 2 MG Tabs   Commonly known as:  BUMEX        Take 1 tablet (2 mg total) by mouth daily.     Jeannette Panda     [    ]    [    ]    [ RaNITidine HCl 150 MG Tabs   Commonly known as:  ZANTAC        Take 1 tablet by mouth 2 (two) times daily.     Radha Jurado     [    ]    [    ]    [    ]    [    ]       Sinan Jurado     [    ]    [    ]    [    ] Short stretch bandages re-applied as noted above. Physical Therapy Wound Goals:  1. Maintain optimal wound healing environment  2. Remove wound bioburden  3.  Decrease periwound edema    Please check blood pressure twice daily, keep record and     Notif Recent Hematology Lab Results  (Last 3 results in the past 90 days)    WBC RBC Hemoglobin Hematocrit MCV MCH MCHC RDW Platelet MPV    (42/81/23)  10.2 (06/13/17)  5.01 (06/13/17)  14.3 (06/13/17)  44.5 (06/13/17)  88.8 -- -- -- (06/13/17)  211.0 --    (06/ Additional Information       We are concerned for your overall well being:    - If you are a smoker or have smoked in the last 12 months, we encourage you to explore options for quitting.     - If you have concerns related to behavioral health issues or th call your provider or healthcare team if you have any questions regarding your medications while at home. Ant-Infective Medications     cefdinir 300 MG Oral Cap         Use: Treat infections or suspected infection   Most common side effects:  Allerg alprazolam 0.25 MG Oral Tab       Use: Treat agitation and difficulty sleeping   Most common side effects: Drowsiness, slows breathing   What to report to your healthcare team: Problems staying awake, confusion, memory problems             All Other Medic

## (undated) NOTE — IP AVS SNAPSHOT
[facility] [place] [address] [place], [address] ~ [phone]                Discharge Summary   5/19/2017    Clyde Inman           Admission Information        Provider Department    5/19/2017 German Loya MD  [id number] Last time this was given:  40 mg on 5/20/2017  8:43 AM   Commonly known as:  PRINIVIL,ZESTRIL        TAKE 1 TABLET BY MOUTH EVERY DAY    Marlene Friedman                           Memantine HCl 10 MG Tabs   Last time this was given:  10 mg on 5/20/2017  8:43 Contact information:    [address] [phone]          Schedule an appointment as soon as possible for a visit with Wound Care clinic.       Discharge Orders     Future Labs/Procedures Expected by Expires    WOUND authorization, please feel free to schedule your appointment immediately. However, if you are unsure about the requirements for authorization, please wait 48-72 hours and then contact the [address] [place] [address] [facility] at [phone].  At that time, you will be pr HgbA1C Glucose BUN Creatinine Calcium Alkaline Phosph AST    (05/20/17)  10.3 (H) (05/20/17)  209 (H) (05/20/17)  12 (05/20/17)  1.07 (05/20/17)  8.4 (05/19/17)  110 (05/19/17)  12 (L)      Metabolic Lab Results  (Last result in the past 90 days)    ALT B Call [phone] for help. [facility] is NOT to be used for urgent needs.   For medical emergencies, dial 911.             _____________________________________________________________________________    Medication Side Effects - Medications to be taken at Ranitidine HCl (ZANTAC) 150 MG Oral Tab       Use:  Nausea/vomiting, acid reflux, low bowel motility, stomach pain   Most common side effects:  Depends on the specific medication but generally include: diarrhea, constipation, headache, allergic reaction (

## (undated) NOTE — IP AVS SNAPSHOT
Patient Demographics     Address Phone E-mail Address    [address]  [place] [address] [phone] (Home) *Preferred* [email or web address]      Emergency Contact(s)     Name Relation Home Work Mobile    Sveta Miranda Spouse [phone] Please check blood pressure twice daily, keep record and     Notify physician if you experience any of the followin. Fever  2. . persistent Nausea/vomiting  3. severe uncontrolled pain  4. redness, tenderness, or signs of infection (pain, swelling, r Johanna Campos     [    ]    [    ]    [    ]    [    ]       furosemide 40 MG Tabs   Commonly known as:  LASIX        Take 1 tablet (40 mg total) by mouth 2 (two) times daily.     Marisa Mcintyre     [    ]    [    ]    [    ]    [    ]       Lisseth Douglas Please  your prescriptions at the location directed by your doctor or nurse     Bring a paper prescription for each of these medications    - cefdinir 300 MG Caps            [id number] - MAR ACTION REPORT  (last 24 hrs)    ** SITE UNKNOWN **     Or Vitals 105/69 mmHg Filed at 06/14/2017 0930    Pulse 70 Filed at 06/14/2017 0930    Resp 18 Filed at 06/14/2017 0930    Temp 98.3 °F (36.8 °C) Filed at 06/14/2017 0930    SpO2 93 % Filed at 06/14/2017 0930      Patient's Most Recent Weight       Most Rece Blood Culture Result No Growth 2 Days     Blood Culture FREQ X 2 [[id number]] Collected:  06/09/17 6292    Order Status:  Completed Lab Status:  Preliminary result Updated:  06/13/17 1900    Specimen Information:  Blood from Blood,peripheral      Blood Cu Past Surgical History    CATARACT      Comment phaco ou 3-4yrs ago        ALL:  No Known Allergies     HOME MEDS    No outpatient prescriptions have been marked as taking for the 6/9/17 encounter [facility] Encounter).     Scheduled Medication:  • piperacill CO2  25.0  27.0   BUN  26*  25*   CREATSERUM  1.53*  1.41*   GLU  274*  165*   CA  9.4  8.6   MG   --   1.7       Recent Labs   Lab  06/09/17   1635   ALT  28   AST  25   ALB  3.1*           Radiology: Xr Chest Pa + Lat Chest (rwe=[id number])    6/9/2017  PROCE There is no midline shift or mass-effect. The basal cisterns are patent. The gray-white matter differentiation is intact. There is no acute intracranial hemorrhage or extra-axial fluid collection. There is no evidence of acute territorial infarction.  St The left posterior tibial veins are not visualized.  2. Prominent bilateral lower extremity soft tissue edema    Dictated by: Colonel Greg MD on 5/19/2017 at 20:28     Approved by: Colonel Greg MD            Xr Chest Ap Portable  (cpt=71010)    5/ Radha Hernandez MD  [facility] IM Hospitalist  Pager: [phone]          **Certification        PHYSICIAN Certification of Need for Inpatient Hospitalization - Initial Certification      Patient will require inpatient services that will reasonably be expected to Weight Bearing Restriction: None                PAIN ASSESSMENT   Ratin          BALANCE                                                                                                                     Static Sitting: Fair +  Dynamic Sitting: Fair + RN for further standing trials for incr safety. Completed 3 add'l standing trials with RN present, unable to take steps in any direction with either LE. Max standing duration of 10 seconds with RW and mod A.  Max Ax2 for sit>supine, total Ax2 to scoot to HO CURRENT GOALS      Goal #1  Patient is able to demonstrate supine - sit EOB @ level: supervision       Goal #2  Patient is able to demonstrate transfers Sit to/from Stand at assistance level: supervision       Goal #3  Patient is able to ambulate 10 feet Pt is oriented to self, unable to provided a stated goal. Family goal would be for pt to gain function and go home.         OBJECTIVE       WEIGHT BEARING RESTRICTION  Weight Bearing Restriction: None                PAIN ASSESSMENT  Ratin           ACTI training; Endurance training;Patient/Family training;Equipment eval/education; Compensatory technique education  Rehab Potential : Fair  Frequency (Obs): 5x/week      OT Goals: progressing 6/13/17  ADL Goals  Patient will perform eating: with set up and with

## (undated) NOTE — ED AVS SNAPSHOT
[facility] Emergency Department    [place]  [address] [address]    Phone:  [phone]    Fax:  [phone]           Aguilar Davis   MRN: [id number]    Department:  [facility] Emergency Department   Date of Visit:  1/5 IF THERE IS ANY CHANGE OR WORSENING OF YOUR CONDITION, CALL YOUR PRIMARY CARE PHYSICIAN AT ONCE OR RETURN IMMEDIATELY TO THE EMERGENCY DEPARTMENT.     If you have been prescribed any medication(s), please fill your prescription right away and begin taking t

## (undated) NOTE — MR AVS SNAPSHOT
[facility]  [place] [address] [address]  [phone]               Thank you for choosing us for your health care visit with [facility].   We are glad to serve you and happy to provide you with this summary of yo Memantine HCl 10 MG Tabs   Take 1 tablet (10 mg total) by mouth 2 (two) times daily.    Commonly known as:  NAMENDA           MetFORMIN HCl 1000 MG Tabs   TAKE 1 TABLET BY MOUTH TWICE DAILY   Commonly known as:  GLUCOPHAGE           Multiple Vitamin Tabs Eat plenty of protein, keep the fat content low Sugars:  sodas and sports drinks, candies and desserts   Eat plenty of low-fat dairy products High fat meats and dairy   Choose whole grain products Foods high in sodium   Water is best for hydration Fast whitney